# Patient Record
Sex: MALE | Race: WHITE | NOT HISPANIC OR LATINO | Employment: FULL TIME | URBAN - METROPOLITAN AREA
[De-identification: names, ages, dates, MRNs, and addresses within clinical notes are randomized per-mention and may not be internally consistent; named-entity substitution may affect disease eponyms.]

---

## 2017-01-19 ENCOUNTER — OFFICE VISIT (OUTPATIENT)
Dept: FAMILY MEDICINE | Facility: CLINIC | Age: 52
End: 2017-01-19
Payer: COMMERCIAL

## 2017-01-19 VITALS
WEIGHT: 191.2 LBS | HEART RATE: 73 BPM | BODY MASS INDEX: 28.22 KG/M2 | TEMPERATURE: 98.2 F | DIASTOLIC BLOOD PRESSURE: 80 MMHG | OXYGEN SATURATION: 96 % | SYSTOLIC BLOOD PRESSURE: 126 MMHG

## 2017-01-19 DIAGNOSIS — J32.9 OTHER SINUSITIS, UNSPECIFIED CHRONICITY: Primary | ICD-10-CM

## 2017-01-19 PROCEDURE — 99213 OFFICE O/P EST LOW 20 MIN: CPT | Mod: 24 | Performed by: PHYSICIAN ASSISTANT

## 2017-01-19 NOTE — MR AVS SNAPSHOT
"              After Visit Summary   2017    Jaguar Liu    MRN: [id number]           Patient Information     Date Of Birth          1965        Visit Information        Provider Department      2017 1:10 PM Otto Leung PA-C [facility]        Today's Diagnoses     Other sinusitis, unspecified chronicity    -  1        Follow-ups after your visit        Who to contact     If you have questions or need follow up information about today's clinic visit or your schedule please contact [facility] directly at [phone].  Normal or non-critical lab and imaging results will be communicated to you by [employer], letter or phone within 4 business days after the clinic has received the results. If you do not hear from us within 7 days, please contact the clinic through [employer] or phone. If you have a critical or abnormal lab result, we will notify you by phone as soon as possible.  Submit refill requests through [employer] or call your pharmacy and they will forward the refill request to us. Please allow 3 business days for your refill to be completed.          Additional Information About Your Visit        MyChart Information     [employer] lets you send messages to your doctor, view your test results, renew your prescriptions, schedule appointments and more. To sign up, go to [email or web address] . Click on \"Log in\" on the left side of the screen, which will take you to the Welcome page. Then click on \"Sign up Now\" on the right side of the page.     You will be asked to enter the access code listed below, as well as some personal information. Please follow the directions to create your username and password.     Your access code is: [email or web address]  Expires: 2017  1:21 PM     Your access code will  in 90 days. If you need help or a new code, please call your [facility] or [phone].        Care EveryWhere ID     This is your Care EveryWhere ID. This could be " used by other organizations to access your [facility] medical records  [id number]        Your Vitals Were     Pulse Temperature Pulse Oximetry             73 98.2  F (36.8  C) (Oral) 96%          Blood Pressure from Last 3 Encounters:   01/19/17 126/80   11/11/16 149/91   11/02/16 122/76    Weight from Last 3 Encounters:   01/19/17 191 lb 3.2 oz (86.728 kg)   11/15/16 194 lb (87.998 kg)   11/07/16 193 lb 9.4 oz (87.81 kg)              Today, you had the following     No orders found for display         Today's Medication Changes          These changes are accurate as of: 1/19/17  1:21 PM.  If you have any questions, ask your nurse or doctor.               Start taking these medicines.        Dose/Directions    amoxicillin-clavulanate 875-125 MG per tablet   Commonly known as:  AUGMENTIN   Used for:  Other sinusitis, unspecified chronicity   Started by:  Otto Leung PA-C        Dose:  1 tablet   Take 1 tablet by mouth 2 times daily   Quantity:  20 tablet   Refills:  0            Where to get your medicines      These medications were sent to [facility] [address] [place], [place] [address], [place] - [address]  [address], [place] [address]     Phone:  [phone]    - amoxicillin-clavulanate 875-125 MG per tablet             Primary Care Provider Office Phone # Fax #    Freddy Marroquin [phone]       [facility] [address] [facility] [address]        Thank you!     Thank you for choosing [facility]  for your care. Our goal is always to provide you with excellent care. Hearing back from our patients is one way we can continue to improve our services. Please take a few minutes to complete the written survey that you may receive in the mail after your visit with us. Thank you!             Your Updated Medication List - Protect others around you: Learn how to safely use, store and throw away your medicines at www.disposemymeds.org.          This list is  accurate as of: 1/19/17  1:21 PM.  Always use your most recent med list.                   Brand Name Dispense Instructions for use    amoxicillin-clavulanate 875-125 MG per tablet    AUGMENTIN    20 tablet    Take 1 tablet by mouth 2 times daily       cetirizine 10 MG tablet    zyrTEC     Take 10 mg by mouth 2 times daily       NEXIUM PO      Take by mouth daily

## 2017-01-19 NOTE — PROGRESS NOTES
SUBJECTIVE:                                                    Jaguar Liu is a 51 year old male who presents to clinic today for the following health issues:    RESPIRATORY SYMPTOMS      Duration: 1 month - was feeling better - 2 days sx's got worse    Description Cough, chest congestion, sore throat which has resolved, shortness of breath, nasal congestion.    Severity: moderate    Accompanying signs and symptoms: None    History (predisposing factors):  none    Precipitating or alleviating factors: None    Therapies tried and outcome:  Dayquil, nyquil - not much relief      Problem list and histories reviewed & adjusted, as indicated.  Additional history: as documented    Patient Active Problem List   Diagnosis     CARDIOVASCULAR SCREENING; LDL GOAL LESS THAN 160     FHx: prostate cancer     Tear of medial meniscus of left knee     Patellofemoral arthritis     S/P arthroscopy of left knee     Past Surgical History   Procedure Laterality Date     Rotator cuff repair rt/lt       Arthroscopy knee rt/lt       C oral surgery procedure       C eye service or procedure opnp       Echo stress test  2011     Hc knee scope,med/lat menisectomy Left 11/11/16     Arthroscopy knee Left 11/11/2016     Procedure: ARTHROSCOPY KNEE;  Surgeon: Freddy Kenney MD;  Location:  OR       Social History   Substance Use Topics     Smoking status: Never Smoker      Smokeless tobacco: Not on file     Alcohol Use: Yes      Comment: social drink     Family History   Problem Relation Age of Onset     CANCER Mother      lung cancer     CANCER Father      lung cancer     Prostate Cancer Brother      age 52         Current Outpatient Prescriptions   Medication Sig Dispense Refill     amoxicillin-clavulanate (AUGMENTIN) 875-125 MG per tablet Take 1 tablet by mouth 2 times daily 20 tablet 0     Esomeprazole Magnesium (NEXIUM PO) Take by mouth daily       cetirizine (ZYRTEC) 10 MG tablet Take 10 mg by mouth 2 times daily        No Known  Allergies  Problem list, Medication list, Allergies, and Medical/Social/Surgical histories reviewed in [facility] and updated as appropriate.    ROS:  Constitutional, HEENT, cardiovascular, pulmonary, gi and gu systems are negative, except as otherwise noted.    OBJECTIVE:                                                    /80 mmHg  Pulse 73  Temp(Src) 98.2  F (36.8  C) (Oral)  Wt 191 lb 3.2 oz (86.728 kg)  SpO2 96%  Body mass index is 28.22 kg/(m^2).  GENERAL: healthy, alert and no distress  EYES: Eyes grossly normal to inspection, PERRL and conjunctivae and sclerae normal  HENT: ear canals and TM's normal, nose and mouth without ulcers or lesions. Nasal congestion with posterior nasal drainage. no maxillary tenderness.   NECK: no adenopathy, no asymmetry, masses, or scars and thyroid normal to palpation  RESP: lungs clear to auscultation - no rales, rhonchi or wheezes  CV: regular rate and rhythm, normal S1 S2, no S3 or S4, no murmur, click or rub, no peripheral edema    Diagnostic Test Results:  none      ASSESSMENT/PLAN:                                                        ICD-10-CM    1. Other sinusitis, unspecified chronicity J32.9 amoxicillin-clavulanate (AUGMENTIN) 875-125 MG per tablet   Warning signs discussed.  side effects discussed  Symptomatic treatment: such as fluids,  OTC acetaminophen and /or non-steroidal anti-inflammatory medication.  Follow up  1-2 wks as needed     Otto Leung PA-C  [facility]

## 2017-04-10 ENCOUNTER — TELEPHONE (OUTPATIENT)
Dept: FAMILY MEDICINE | Facility: CLINIC | Age: 52
End: 2017-04-10

## 2017-04-10 NOTE — TELEPHONE ENCOUNTER
Panel Management Review      Patient has the following on his problem list: None      Composite cancer screening  Chart review shows that this patient is due/due soon for the following Colonoscopy  Summary:    Patient is due/failing the following:   COLONOSCOPY    Action needed:   Patient needs referral/order: colon    Type of outreach:    Sent letter.    Questions for provider review:    None                                                                                                                                    OUMAR Thompson   11:02 AM  4/10/2017         Chart routed to closed .

## 2017-04-10 NOTE — LETTER
[facility]  [address] [facility] [address]  Phone: [phone]    04/10/17    Jaguar Liu  [address] [place] [address]      To whom it may concern:     Our records indicate that you have not scheduled for a(n)colonoscopy which was recommended by your health care team. Monitoring and managing your preventative and chronic health conditions are very important to us.     If you have received your health care elsewhere, please provide us with that information so it can be documented in your chart.    Please call [phone] or message us through your [email or web address] account to schedule an appointment or provide information for your chart.     I look forward to seeing you and working with you on your health care needs.       *If you have already scheduled an appointment, please disregard this reminder    Sincerely,      Freddy Marroquin MD

## 2017-05-10 ENCOUNTER — OFFICE VISIT (OUTPATIENT)
Dept: FAMILY MEDICINE | Facility: CLINIC | Age: 52
End: 2017-05-10
Payer: COMMERCIAL

## 2017-05-10 VITALS
OXYGEN SATURATION: 97 % | BODY MASS INDEX: 28.06 KG/M2 | HEART RATE: 68 BPM | WEIGHT: 190 LBS | SYSTOLIC BLOOD PRESSURE: 130 MMHG | DIASTOLIC BLOOD PRESSURE: 75 MMHG | TEMPERATURE: 97 F

## 2017-05-10 DIAGNOSIS — R42 DIZZINESS: Primary | ICD-10-CM

## 2017-05-10 DIAGNOSIS — J06.9 VIRAL URI: ICD-10-CM

## 2017-05-10 PROCEDURE — 99214 OFFICE O/P EST MOD 30 MIN: CPT | Performed by: FAMILY MEDICINE

## 2017-05-10 NOTE — NURSING NOTE
"No chief complaint on file.      Initial /75  Pulse 68  Temp 97  F (36.1  C) (Oral)  Wt 190 lb (86.2 kg)  SpO2 97%  BMI 28.06 kg/m2 Estimated body mass index is 28.06 kg/(m^2) as calculated from the following:    Height as of 11/15/16: 5' 9\" (1.753 m).    Weight as of this encounter: 190 lb (86.2 kg).  BP completed using cuff size: kirsty LANDA CMA ([facility])  11:58 AM 5/10/2017      "

## 2017-05-10 NOTE — MR AVS SNAPSHOT
After Visit Summary   5/10/2017    Jaguar Liu    MRN: [id number]           Patient Information     Date Of Birth          1965        Visit Information        Provider Department      5/10/2017 12:00 PM Joselyn Rayo MD [facility]        Today's Diagnoses     Dizziness    -  1      Care Instructions      Dizziness (Vertigo) and Balance Problems: Ensuring Your Safety  Falls or accidents can lead to pain, broken bones, and fear of future falls. Protect yourself and others by preparing for episodes. Simple steps can help increase your safety at home and wherever you go.  Lighting    Keep all areas well lit. This helps your eyes send the right signals to the brain. It also makes you less likely to trip and fall. If bright lights make symptoms worse, dim the lights or lie in a dark room until the dizziness passes. Then turn the lights back to their normal level.  Tips:    Keep a flashlight by the bed.    Place nightlights in bathrooms and hallways.    Replace burned-out bulbs, or have someone replace them for you.  Fall prevention  To reduce your risk of falling:    Rise out of a bed or chair slowly.    Wear low-heeled shoes that fit properly and have slip-resistant soles.    Remove throw rugs. Clear clutter from walkways.    Use handrails on stairs. Have handrails installed or adjusted if needed.    Install grab bars in the bathroom. Don't use towel racks for balance.    Use a shower stool. Also, apply adhesive strips to the shower or tub floor.  Going out  With a little time and preparation, you can get around safely.  Tips:    Bring a cane or walking aid if needed.    Give yourself plenty of time in case a dizziness episode begins.    Be patient. If an activity like walking through a crowded shop causes you stress, you may not be ready for it yet.  Driving  If you become dizzy or disoriented while driving, you could hurt yourself and others. That's why it's best to avoid  driving until symptoms subside. In some cases, your license may be temporarily held until it's safe for you to drive again.  For safety:    Ask a friend to drive for you.    Take public transportation.    Walk to stores and other places when you can.  Asking for help  Don't be afraid to ask for help running errands, cooking meals, and doing exercise. Whether it's a friend, loved one, neighbor, or stranger on the street, a little help can make a world of difference.     7454-6517 [employer]. [address] [place] [address]. All rights reserved. This information is not intended as a substitute for professional medical care. Always follow your healthcare professional's instructions.        Dizziness (Uncertain Cause)  Dizziness is a common symptom. It may be described as lightheadedness, spinning, or feeling like you are going to faint. Dizziness can have many causes.  Be sure to tell the healthcare provider about:    All medicines you take, including prescription, over-the-counter, herbs, and supplements    Any other symptoms you have    Any health problems you are being treated for    Anything that causes the dizziness to get worse or better  Today's exam did not show an exact cause for your dizziness. Other tests may be needed. Follow up with your healthcare provider.  Home care    Dizziness that occurs with sudden standing may be a sign of mild dehydration. Drink extra fluids for the next few days.    If you recently started a new medicine, stopped a medicine, or had the dose of a current medicine changed, talk with the prescribing healthcare provider. Your medicine plan may need adjustment.    If dizziness lasts more than a few seconds, sit or lie down until it passes. This may help prevent injury in case you pass out.    Do not drive or use power tools or dangerous equipment until you have had no dizziness for at least 48 hours.  Follow-up care  Follow up with your healthcare provider for  "further evaluation within the next 7 days or as advised.  When to seek medical advice  Call your healthcare provider for any of the following:    Worsening of symptoms or new symptoms    Passing out or seizure    Repeated vomiting    Headache    Palpitations (the sense that your heart is fluttering or beating fast or hard)    Shortness of breath    Blood in vomit or stool (black or red color)    Weakness of an arm or leg or one side of the face    Vision or hearing changes    Trouble walking or speaking    Chest, arm, neck, back, or jaw pain       4806-2208 [employer]. [address] [place] [address]. All rights reserved. This information is not intended as a substitute for professional medical care. Always follow your healthcare professional's instructions.              Follow-ups after your visit        Who to contact     If you have questions or need follow up information about today's clinic visit or your schedule please contact [facility] directly at [phone].  Normal or non-critical lab and imaging results will be communicated to you by MyChart, letter or phone within 4 business days after the clinic has received the results. If you do not hear from us within 7 days, please contact the clinic through [employer] or phone. If you have a critical or abnormal lab result, we will notify you by phone as soon as possible.  Submit refill requests through [employer] or call your pharmacy and they will forward the refill request to us. Please allow 3 business days for your refill to be completed.          Additional Information About Your Visit        [employer] Information     [employer] lets you send messages to your doctor, view your test results, renew your prescriptions, schedule appointments and more. To sign up, go to [email or web address] . Click on \"Log in\" on the left side of the screen, which will take you to the Welcome page. Then click on \"Sign up Now\" on the right side of " the page.     You will be asked to enter the access code listed below, as well as some personal information. Please follow the directions to create your username and password.     Your access code is: [email or web address]  Expires: 2017 12:34 PM     Your access code will  in 90 days. If you need help or a new code, please call your [place] clinic or [phone].        Care EveryWhere ID     This is your Care EveryWhere ID. This could be used by other organizations to access your [place] medical records  [id number]        Your Vitals Were     Pulse Temperature Pulse Oximetry BMI (Body Mass Index)          68 97  F (36.1  C) (Oral) 97% 28.06 kg/m2         Blood Pressure from Last 3 Encounters:   05/10/17 130/75   17 126/80   16 (!) 149/91    Weight from Last 3 Encounters:   05/10/17 190 lb (86.2 kg)   17 191 lb 3.2 oz (86.7 kg)   11/15/16 194 lb (88 kg)              Today, you had the following     No orders found for display       Primary Care Provider Office Phone # Fax #    Freddy Marroquin [phone]       [facility] [address] [facility] [address]        Thank you!     Thank you for choosing [facility]  for your care. Our goal is always to provide you with excellent care. Hearing back from our patients is one way we can continue to improve our services. Please take a few minutes to complete the written survey that you may receive in the mail after your visit with us. Thank you!             Your Updated Medication List - Protect others around you: Learn how to safely use, store and throw away your medicines at www.disposemymeds.org.          This list is accurate as of: 5/10/17 12:34 PM.  Always use your most recent med list.                   Brand Name Dispense Instructions for use    amoxicillin-clavulanate 875-125 MG per tablet    AUGMENTIN    20 tablet    Take 1 tablet by mouth 2 times daily       cetirizine 10 MG tablet     zyrTEC     Take 10 mg by mouth 2 times daily       NEXIUM PO      Take by mouth daily

## 2017-05-10 NOTE — PROGRESS NOTES
SUBJECTIVE:                                                    Jaguar Liu is a 51 year old male who presents to clinic today for the following health issues:      Dizziness- concerned it maybe a sinus issue     Onset: today    Description:   Do you feel faint:  no   Does it feel like the surroundings (bed, room) are moving: no   Unsteady/off balance: no   Have you passed out or fallen: no     Intensity: mild    Progression of Symptoms:  same    Accompanying Signs & Symptoms:  Heart palpitations: no   Nausea, vomiting: no   Weakness in arms or legs: no   Fatigue: no   Vision or speech changes: no   Ringing in ears (Tinnitus): no   Hearing Loss: no    History:   Head trauma/concussion hx: no   Previous similar symptoms: no   Recent bleeding history: no     Precipitating factors:   Worse with activity or head movement: no   Any new medications (BP?): no   Alcohol/drug abuse/withdrawal: no     Alleviating factors:   Does staying in a fixed position give relief:  no        Therapies Tried and outcome: none    Description: patient was at work standing up in a meeting. Was standing up for about 10 minutes and patient took a deep breath and got slightly lightheaded. Just for a couple of seconds and just leaned up a gainst the wall and got better.  Patient then just did a sudden movement and then that wave of dizziness came to him again which he describes as not feeling stable lasted for a few seconds then lingered and so patient went straight to the nurse's office.   Nurse's office patient told his symptoms his BP was slightly higher than normal.   Patient then went home and is now feeling slightly better.   Patient hadn't eaten much prior just had a banana and 2 cups of coffee  No blurring of vision no syncope or presyncope    Aggravating factors: not sure  Relieving factors: rest   Chest pain or palpitation: No  Syncope or presyncope: No  Motor,sensory weakness, or slurring of speech: No  Headache: No  Blurring of  vision : No  Nausea: NO  Vomiting: No  Abdominal pain: No  Recent trauma: No     Patient thought his right ear felt funny the past couple of days.   Also the past 2 days has had some nasal congestion.    Tried supportive treatment  At home no relief  Additional Information: above    Problem list and histories reviewed & adjusted, as indicated.  Additional history: as documented    Problem list, Medication list, Allergies, and Medical/Social/Surgical histories reviewed in EPIC and updated as appropriate.    ROS:  Constitutional, HEENT, cardiovascular, pulmonary, gi and gu systems are negative, except as otherwise noted.    OBJECTIVE:                                                    /75  Pulse 68  Temp 97  F (36.1  C) (Oral)  Wt 190 lb (86.2 kg)  SpO2 97%  BMI 28.06 kg/m2  Body mass index is 28.06 kg/(m^2).  GENERAL: healthy, alert and no distress  EYES: Eyes grossly normal to inspection, PERRL and conjunctivae and sclerae normal  HENT: ear canals and TM's normal, nose and mouth without ulcers or lesions. Positive nasal congestion.   NECK: no adenopathy, no asymmetry, masses, or scars and thyroid normal to palpation  RESP: lungs clear to auscultation - no rales, rhonchi or wheezes  CV: regular rate and rhythm, normal S1 S2, no S3 or S4, no murmur, click or rub, no peripheral edema and peripheral pulses strong  ABDOMEN: soft, nontender, no hepatosplenomegaly, no masses and bowel sounds normal  MS: no gross musculoskeletal defects noted, no edema  SKIN: no suspicious lesions or rashes  NEURO: Normal strength and tone, mentation intact and speech normal  PSYCH: mentation appears normal, affect normal/bright. No thoughts of harming self or others     Diagnostic Test Results:  none   Patient declined ekg. Denies any cardiac signs or symptoms no exertional chest pain or shortness of breath. Patient states he runs 4-5 miles a day without problem     ASSESSMENT/PLAN:                                                         ICD-10-CM    1. Dizziness R42    2. Viral URI J06.9     B97.89        Dizziness is likely peripheral. Possible mild vertigo, positional  Brief and already resolving.  Discussed utility of labs, since already improving, patient ok to hold off on additional labs but if symptoms persist or worsen come in asap  Patient also having viral uri signs or symptoms. Supportive treatment  Discussed. Follow up if persist  See patient instructions.  Signs and symptoms of worsening dizziness discussed. Alarm symptoms of possible CVA or cardiac events discussed. If with any of these advised to go to ER.    No driving and avoid being on any unprotected heights until dizziness is resolved.    Recommend follow up with primary care provider if no relief , sooner if worse  Adverse reactions of medications discussed.  Over the counter medications discussed.   Aware to come back in if with worsening symptoms or if no relief despite treatment plan  Patient voiced understanding and had no further questions.     >13 minutes of the 25 minute visit was spent counseling the patient on his diagnosis and treatment plan     MD Joselyn Crews MD  [facility]

## 2017-05-10 NOTE — PATIENT INSTRUCTIONS
Dizziness (Vertigo) and Balance Problems: Ensuring Your Safety  Falls or accidents can lead to pain, broken bones, and fear of future falls. Protect yourself and others by preparing for episodes. Simple steps can help increase your safety at home and wherever you go.  Lighting    Keep all areas well lit. This helps your eyes send the right signals to the brain. It also makes you less likely to trip and fall. If bright lights make symptoms worse, dim the lights or lie in a dark room until the dizziness passes. Then turn the lights back to their normal level.  Tips:    Keep a flashlight by the bed.    Place nightlights in bathrooms and hallways.    Replace burned-out bulbs, or have someone replace them for you.  Fall prevention  To reduce your risk of falling:    Rise out of a bed or chair slowly.    Wear low-heeled shoes that fit properly and have slip-resistant soles.    Remove throw rugs. Clear clutter from walkways.    Use handrails on stairs. Have handrails installed or adjusted if needed.    Install grab bars in the bathroom. Don't use towel racks for balance.    Use a shower stool. Also, apply adhesive strips to the shower or tub floor.  Going out  With a little time and preparation, you can get around safely.  Tips:    Bring a cane or walking aid if needed.    Give yourself plenty of time in case a dizziness episode begins.    Be patient. If an activity like walking through a crowded shop causes you stress, you may not be ready for it yet.  Driving  If you become dizzy or disoriented while driving, you could hurt yourself and others. That's why it's best to avoid driving until symptoms subside. In some cases, your license may be temporarily held until it's safe for you to drive again.  For safety:    Ask a friend to drive for you.    Take public transportation.    Walk to stores and other places when you can.  Asking for help  Don't be afraid to ask for help running errands, cooking meals, and doing  exercise. Whether it's a friend, loved one, neighbor, or stranger on the street, a little help can make a world of difference.     6630-7471 [employer]. [address] [place] [address], [place] [address]. All rights reserved. This information is not intended as a substitute for professional medical care. Always follow your healthcare professional's instructions.        Dizziness (Uncertain Cause)  Dizziness is a common symptom. It may be described as lightheadedness, spinning, or feeling like you are going to faint. Dizziness can have many causes.  Be sure to tell the healthcare provider about:    All medicines you take, including prescription, over-the-counter, herbs, and supplements    Any other symptoms you have    Any health problems you are being treated for    Anything that causes the dizziness to get worse or better  Today's exam did not show an exact cause for your dizziness. Other tests may be needed. Follow up with your healthcare provider.  Home care    Dizziness that occurs with sudden standing may be a sign of mild dehydration. Drink extra fluids for the next few days.    If you recently started a new medicine, stopped a medicine, or had the dose of a current medicine changed, talk with the prescribing healthcare provider. Your medicine plan may need adjustment.    If dizziness lasts more than a few seconds, sit or lie down until it passes. This may help prevent injury in case you pass out.    Do not drive or use power tools or dangerous equipment until you have had no dizziness for at least 48 hours.  Follow-up care  Follow up with your healthcare provider for further evaluation within the next 7 days or as advised.  When to seek medical advice  Call your healthcare provider for any of the following:    Worsening of symptoms or new symptoms    Passing out or seizure    Repeated vomiting    Headache    Palpitations (the sense that your heart is fluttering or beating fast or hard)    Shortness of  breath    Blood in vomit or stool (black or red color)    Weakness of an arm or leg or one side of the face    Vision or hearing changes    Trouble walking or speaking    Chest, arm, neck, back, or jaw pain       5333-0952 [employer]. [address] [place] [address]. All rights reserved. This information is not intended as a substitute for professional medical care. Always follow your healthcare professional's instructions.

## 2017-10-15 NOTE — NURSING NOTE
"Chief Complaint   Patient presents with     Cough       Initial /80 mmHg  Pulse 73  Temp(Src) 98.2  F (36.8  C) (Oral)  Wt 191 lb 3.2 oz (86.728 kg)  SpO2 96% Estimated body mass index is 28.22 kg/(m^2) as calculated from the following:    Height as of 11/15/16: 5' 9\" (1.753 m).    Weight as of this encounter: 191 lb 3.2 oz (86.728 kg).  BP completed using cuff size: nini Ching CMA      " 1) Please continue Topamax 50mg PO BID for now with plans to increase to 100mg PO BID in 3 days   2) Continue Effexor XR to 150mg PO daily and Gabapentin 300mg PO TID.  3) Continue Trazodone to 100mg PO QHS  4) Pt is agreeable to enter in-pt rehab and social work to coordinate this effort  5) Patient does not require psychiatric 1:1

## 2021-04-20 ENCOUNTER — IMMUNIZATION (OUTPATIENT)
Dept: PEDIATRICS | Facility: CLINIC | Age: 56
End: 2021-04-20
Payer: COMMERCIAL

## 2021-04-20 PROCEDURE — 91300 PR COVID VAC PFIZER DIL RECON 30 MCG/0.3 ML IM: CPT

## 2021-04-20 PROCEDURE — 0001A PR COVID VAC PFIZER DIL RECON 30 MCG/0.3 ML IM: CPT

## 2021-05-11 ENCOUNTER — IMMUNIZATION (OUTPATIENT)
Dept: PEDIATRICS | Facility: CLINIC | Age: 56
End: 2021-05-11
Attending: INTERNAL MEDICINE
Payer: COMMERCIAL

## 2021-05-11 PROCEDURE — 91300 PR COVID VAC PFIZER DIL RECON 30 MCG/0.3 ML IM: CPT

## 2021-05-11 PROCEDURE — 0002A PR COVID VAC PFIZER DIL RECON 30 MCG/0.3 ML IM: CPT

## 2021-05-19 NOTE — PROGRESS NOTES
SUBJECTIVE:                                                    Jaguar Liu is a 55 year old male who presents to clinic today for the following health issues:    Back Pain      Duration: started in the winter time, getting worse        Specific cause: shoveling the driveway    Description:   Location of pain: low back left  Character of pain: shooting pain down buttock   Pain radiation:radiates into the left leg  New numbness or weakness in legs, not attributed to pain:  no    Intensity: Currently 3/10    History:   Pain interferes with job: YES,   History of back problems: no prior back problems  Any previous MRI or X-rays: None, years ago  Sees a specialist for back pain:  No  Therapies tried without relief: chiropractor    Alleviating factors:   Improved by: chiropractor, cold and stretch, rest.      Precipitating factors:  Worsened by: Lifting      Like to golf and pain with that.   Off and on back tightness since dec. Pain down leg after lifting a plant pot 2 wks ago.     Problem list and histories reviewed & adjusted, as indicated.  Additional history: as documented    Patient Active Problem List   Diagnosis     CARDIOVASCULAR SCREENING; LDL GOAL LESS THAN 160     FHx: prostate cancer     Tear of medial meniscus of left knee     Patellofemoral arthritis     S/P arthroscopy of left knee     Past Surgical History:   Procedure Laterality Date     ARTHROSCOPY KNEE Left 11/11/2016    Procedure: ARTHROSCOPY KNEE;  Surgeon: Freddy Kenney MD;  Location: [facility] OR     ARTHROSCOPY KNEE RT/LT       C EYE SERVICE OR PROCEDURE OPNP       C ORAL SURGERY PROCEDURE       ECHO STRESS TEST  2011     HC KNEE SCOPE,MED/LAT MENISECTOMY Left 11/11/16     ROTATOR CUFF REPAIR RT/LT         Social History     Tobacco Use     Smoking status: Never Smoker     Smokeless tobacco: Never Used   Substance Use Topics     Alcohol use: Yes     Comment: social drink     Family History   Problem Relation Age of Onset     Cancer Mother        "  lung cancer     Cancer Father         lung cancer     Prostate Cancer Brother         age 52         Current Outpatient Medications   Medication Sig Dispense Refill     cetirizine (ZYRTEC) 10 MG tablet Take 10 mg by mouth 2 times daily        predniSONE (DELTASONE) 20 MG tablet Take 2 tablets (40 mg) by mouth daily for 5 days 10 tablet 0     Esomeprazole Magnesium (NEXIUM PO) Take by mouth daily       No Known Allergies  Problem list, Medication list, Allergies, and Medical/Social/Surgical histories reviewed in [facility] and updated as appropriate.    ROS:  CV: NEGATIVE for chest pain, palpitations or peripheral edema  C: NEGATIVE for fever, chills, change in weight  E/M: NEGATIVE for ear, mouth and throat problems  R: NEGATIVE for significant cough or SOB    OBJECTIVE:                                                    /86   Pulse 60   Temp 97.1  F (36.2  C) (Tympanic)   Ht 1.753 m (5' 9\")   Wt 87.5 kg (193 lb)   SpO2 98%   BMI 28.50 kg/m    Body mass index is 28.5 kg/m .   GENERAL: healthy, alert, well nourished, well hydrated, no distress  RESP: lungs clear to auscultation - no rales, no rhonchi, no wheezes  CV: regular rates and rhythm, normal S1 S2, no S3 or S4 and no murmur, no click or rub -  ABDOMEN: soft, no tenderness, no  hepatosplenomegaly, no masses, normal bowel sounds  Lumber/Thoracic Spine Exam: Tender:  none  Range of Motion:  full range of motion gary lower extremities   Strength:  5/5 gary lower extremities   Special tests:  negative straight leg raises  Hip Exam: Hip ROM full         ASSESSMENT/PLAN:                                                        ICD-10-CM    1. Lumbar radiculopathy  M54.16 predniSONE (DELTASONE) 20 MG tablet     [facility] PT [facility] HAND REFERRAL   ice or cold packs 20 minutes every 2-3 hrs as needed to relieve pain and swelling, for the first 2 days. Then can apply heat 20 minutes every 2-3 hrs (avoid sleeping on heating pad) there after as needed.   If you can tolerate, " start non-steroidal anti-inflammatory medication like: Ibuprofen 600-800 mg three times daily or Aleve 2 tablets of over the counter strength twice a day as needed.   Tylenol can help with pain also.    Active range of motion exercises encouraged  Activity modification trying to avoid activities that cause you pain.   Start prednisone. warning signs discussed. side effects discussed   Start PHYSICAL THERAPY.   Recheck 4 wk.       Otto Leung PA-C  [facility]

## 2021-05-20 ENCOUNTER — OFFICE VISIT (OUTPATIENT)
Dept: FAMILY MEDICINE | Facility: CLINIC | Age: 56
End: 2021-05-20
Payer: COMMERCIAL

## 2021-05-20 VITALS
WEIGHT: 193 LBS | BODY MASS INDEX: 28.58 KG/M2 | SYSTOLIC BLOOD PRESSURE: 136 MMHG | TEMPERATURE: 97.1 F | HEART RATE: 60 BPM | DIASTOLIC BLOOD PRESSURE: 86 MMHG | HEIGHT: 69 IN | OXYGEN SATURATION: 98 %

## 2021-05-20 DIAGNOSIS — M54.16 LUMBAR RADICULOPATHY: Primary | ICD-10-CM

## 2021-05-20 PROCEDURE — 99204 OFFICE O/P NEW MOD 45 MIN: CPT | Performed by: PHYSICIAN ASSISTANT

## 2021-05-20 RX ORDER — PREDNISONE 20 MG/1
40 TABLET ORAL DAILY
Qty: 10 TABLET | Refills: 0 | Status: SHIPPED | OUTPATIENT
Start: 2021-05-20 | End: 2021-05-25

## 2021-05-20 ASSESSMENT — MIFFLIN-ST. JEOR: SCORE: 1690.82

## 2021-05-20 ASSESSMENT — PAIN SCALES - GENERAL: PAINLEVEL: MILD PAIN (3)

## 2021-05-20 NOTE — NURSING NOTE
"Chief Complaint   Patient presents with     Back Pain       Initial /86   Pulse 60   Temp 97.1  F (36.2  C) (Tympanic)   Ht 1.753 m (5' 9\")   Wt 87.5 kg (193 lb)   SpO2 98%   BMI 28.50 kg/m   Estimated body mass index is 28.5 kg/m  as calculated from the following:    Height as of this encounter: 1.753 m (5' 9\").    Weight as of this encounter: 87.5 kg (193 lb).  Medication Reconciliation: complete  Marcella Wetzel CMA  "

## 2021-05-24 ENCOUNTER — TELEPHONE (OUTPATIENT)
Dept: FAMILY MEDICINE | Facility: CLINIC | Age: 56
End: 2021-05-24

## 2021-05-24 DIAGNOSIS — M54.16 LUMBAR RADICULOPATHY: ICD-10-CM

## 2021-05-24 RX ORDER — PREDNISONE 20 MG/1
40 TABLET ORAL DAILY
Qty: 10 TABLET | Refills: 0 | Status: CANCELLED | OUTPATIENT
Start: 2021-05-24

## 2021-05-24 NOTE — TELEPHONE ENCOUNTER
Patient states took last dose of prednisone today, has helped with the back pain but now c/o sciatic pain  Has been taking over the counter Ibuprofen and applying heat which has also helped    To provider to advise    Sahra NAIDUN, RN

## 2021-05-24 NOTE — TELEPHONE ENCOUNTER
Patient notified of provider's message as written below. Patient verbalized good understanding, had no further questions and needed no further support.Shana Buckner R.N.

## 2021-05-24 NOTE — TELEPHONE ENCOUNTER
Recommend he continue with ice and ibu.   Prednisone is only used for short time as I prescribed.   Try to see how the week  Goes as this may continue to get better day by day.    Otto Leung PA-C

## 2021-05-27 NOTE — PROGRESS NOTES
"SUBJECTIVE:                                                    Jaguar Liu is a 55 year old male who presents to clinic today for the following health issues:    Back Pain      Duration: ***        Specific cause: {.:277877::\"none\"}    Description:   Location of pain: {.:106968}  Character of pain: {.:384159}  Pain radiation:{.:961388::\"none\"}  New numbness or weakness in legs, not attributed to pain:  { :702352::\"no\"}    Intensity: {.:249568::\"Currently ***/10\"}    History:   Pain interferes with job: {.:113268::\"***\"}  History of back problems: {.:325549::\"no prior back problems\"}  Any previous MRI or X-rays: {.:199437::\"None\"}  Sees a specialist for back pain:  { :203909::\"No\"}  Therapies tried without relief: {.:598992}    Alleviating factors:   Improved by: {.:552196}      Precipitating factors:  Worsened by: {.:266266::\"Nothing\"}          Problem list and histories reviewed & adjusted, as indicated.  Additional history: as documented    Patient Active Problem List   Diagnosis     CARDIOVASCULAR SCREENING; LDL GOAL LESS THAN 160     FHx: prostate cancer     Tear of medial meniscus of left knee     Patellofemoral arthritis     S/P arthroscopy of left knee     Past Surgical History:   Procedure Laterality Date     ARTHROSCOPY KNEE Left 11/11/2016    Procedure: ARTHROSCOPY KNEE;  Surgeon: Freddy Kenney MD;  Location: [facility] OR     ARTHROSCOPY KNEE RT/LT       C EYE SERVICE OR PROCEDURE OPNP       C ORAL SURGERY PROCEDURE       ECHO STRESS TEST  2011     HC KNEE SCOPE,MED/LAT MENISECTOMY Left 11/11/16     ROTATOR CUFF REPAIR RT/LT         Social History     Tobacco Use     Smoking status: Never Smoker     Smokeless tobacco: Never Used   Substance Use Topics     Alcohol use: Yes     Comment: social drink     Family History   Problem Relation Age of Onset     Cancer Mother         lung cancer     Cancer Father         lung cancer     Prostate Cancer Brother         age 52         Current Outpatient Medications " "  Medication Sig Dispense Refill     cetirizine (ZYRTEC) 10 MG tablet Take 10 mg by mouth 2 times daily        Esomeprazole Magnesium (NEXIUM PO) Take by mouth daily       No Known Allergies  Problem list, Medication list, Allergies, and Medical/Social/Surgical histories reviewed in [facility] and updated as appropriate.    ROS:  {ROS - brief:393608::\"C: NEGATIVE for fever, chills, change in weight\",\"E/M: NEGATIVE for ear, mouth and throat problems\",\"R: NEGATIVE for significant cough or SOB\",\"CV: NEGATIVE for chest pain, palpitations or peripheral edema\"}    OBJECTIVE:                                                    There were no vitals taken for this visit.  There is no height or weight on file to calculate BMI.   GENERAL: healthy, alert, well nourished, well hydrated, no distress  RESP: lungs clear to auscultation - no rales, no rhonchi, no wheezes  CV: regular rates and rhythm, normal S1 S2, no S3 or S4 and no murmur, no click or rub -  ABDOMEN: soft, no tenderness, no  hepatosplenomegaly, no masses, normal bowel sounds  Lumber/Thoracic Spine Exam: Tender:  {THORACIC/LUMBAR SPINE TENDERNESS:722571}  Non-tender:  {THORACIC/LUMBAR SPINE TENDERNESS:925893}  Range of Motion:  full range of motion gary lower extremities   Strength:  5/5 gary lower extremities   Special tests:  {FSOC THORACIC/LUMBAR SPINE SPECIAL TESTS:470354}  Hip Exam: {FSOC SHORT HIP:973876}    {Diagnostic Test Results:146972}     ASSESSMENT/PLAN:                                                    No diagnosis found.    {FOLLOW UP CLINIC OPTIONS:413942}    Otto Leung PA-C  [facility]  "

## 2021-05-28 ENCOUNTER — ANCILLARY PROCEDURE (OUTPATIENT)
Dept: GENERAL RADIOLOGY | Facility: CLINIC | Age: 56
End: 2021-05-28
Attending: PHYSICIAN ASSISTANT
Payer: COMMERCIAL

## 2021-05-28 ENCOUNTER — OFFICE VISIT (OUTPATIENT)
Dept: FAMILY MEDICINE | Facility: CLINIC | Age: 56
End: 2021-05-28
Payer: COMMERCIAL

## 2021-05-28 VITALS
WEIGHT: 196 LBS | HEART RATE: 63 BPM | RESPIRATION RATE: 18 BRPM | DIASTOLIC BLOOD PRESSURE: 82 MMHG | TEMPERATURE: 97.1 F | BODY MASS INDEX: 29.03 KG/M2 | SYSTOLIC BLOOD PRESSURE: 137 MMHG | HEIGHT: 69 IN | OXYGEN SATURATION: 98 %

## 2021-05-28 DIAGNOSIS — M54.16 LUMBAR RADICULOPATHY: Primary | ICD-10-CM

## 2021-05-28 PROCEDURE — 72100 X-RAY EXAM L-S SPINE 2/3 VWS: CPT | Performed by: RADIOLOGY

## 2021-05-28 PROCEDURE — 99213 OFFICE O/P EST LOW 20 MIN: CPT | Performed by: PHYSICIAN ASSISTANT

## 2021-05-28 RX ORDER — DICLOFENAC POTASSIUM 50 MG/1
50 TABLET, FILM COATED ORAL 3 TIMES DAILY
Qty: 90 TABLET | Refills: 0 | Status: SHIPPED | OUTPATIENT
Start: 2021-05-28 | End: 2023-06-02

## 2021-05-28 RX ORDER — METHOCARBAMOL 750 MG/1
750 TABLET, FILM COATED ORAL 3 TIMES DAILY
Qty: 90 TABLET | Refills: 0 | Status: SHIPPED | OUTPATIENT
Start: 2021-05-28 | End: 2021-06-27

## 2021-05-28 ASSESSMENT — MIFFLIN-ST. JEOR: SCORE: 1704.43

## 2021-05-28 NOTE — RESULT ENCOUNTER NOTE
Mr. Liu,    Your x-ray was read as normal by the radiologist.     Please contact the clinic if you have additional questions.  Thank you.    Sincerely,    Otto Leung PA-C

## 2021-05-28 NOTE — LETTER
May 28, 2021      Jaguar Liu  [address] [facility] [id number]        Mr. Liu,     Your x-ray was read as normal by the radiologist.     Please contact the clinic if you have additional questions.  Thank you.     Sincerely,     Otto Leung PA-C     Resulted Orders   XR Lumbar Spine 2/3 Views    Narrative    LUMBAR SPINE TWO TO THREE VIEWS   5/28/2021 10:20 AM     HISTORY: Lumbar radiculopathy.    COMPARISON: None.      Impression    IMPRESSION: Vertebral body heights are maintained. Posterior alignment  is normal. Disc spaces are relatively preserved. Soft tissues  unremarkable.    MARGOTH ANTONIO MD       If you have any questions or concerns, please call the clinic at the number listed above.       Sincerely,      Otto Leung PA-C/sp

## 2021-05-28 NOTE — PROGRESS NOTES
"  Assessment & Plan       ICD-10-CM    1. Lumbar radiculopathy  M54.16 XR Lumbar Spine 2/3 Views     methocarbamol (ROBAXIN) 750 MG tablet     diclofenac (CATAFLAM) 50 MG tablet     Talk to patient about his concerns.  We will get him started on Robaxin and diclofenac.  Warning signs side effects were discussed.  We will have him keep his appointment with physical therapy.  If things progressively worsen we may consider doing an MRI sooner than later.    Return in about 4 weeks (around 6/25/2021) for recheck.    RAOUL Gordillo [facility]    Heidi Montesinos is a 55 year old who presents for the following health issues     HPI     Pt here following up on low back pain that radiates into the knee, was last seen on 5/21/21and given prednisone which helped some but back is still bothering him. Is scheduled for therapy but unable to get in until 6/10/21  Pt saw chiropractor and was told his pelvis may be out of alignment   Feels ok with sitting and increase with standing and walking.   4/ 10 today. Feeling better today  Yesterday was 8/10.     History of knee surgery: Pt found a lump behind is right knee - wondering if this could be part of the reason for the back pain         Review of Systems   Constitutional, HEENT, cardiovascular, pulmonary, gi and gu systems are negative, except as otherwise noted.      Objective    /82   Pulse 63   Temp 97.1  F (36.2  C) (Tympanic)   Resp 18   Ht 1.753 m (5' 9\")   Wt 88.9 kg (196 lb)   SpO2 98%   BMI 28.94 kg/m    Body mass index is 28.94 kg/m .  Physical Exam   GENERAL: healthy, alert and no distress  No tenderness today of his lower lumbar back region.  He describes the pain more in the left buttock and down his left leg.  He has 5-5 bilateral lower extremity strength.  He has a negative straight leg raise but has sensation of tightness in his hamstring region.  Calves soft and tender neuro vas intact distally.  He does have a " Baker's cyst behind his left knee this nontender.    Results for orders placed or performed in visit on 05/28/21   XR Lumbar Spine 2/3 Views     Status: None    Narrative    LUMBAR SPINE TWO TO THREE VIEWS   5/28/2021 10:20 AM     HISTORY: Lumbar radiculopathy.    COMPARISON: None.      Impression    IMPRESSION: Vertebral body heights are maintained. Posterior alignment  is normal. Disc spaces are relatively preserved. Soft tissues  unremarkable.    MARGOTH ANTONIO MD

## 2021-06-10 ENCOUNTER — THERAPY VISIT (OUTPATIENT)
Dept: PHYSICAL THERAPY | Facility: CLINIC | Age: 56
End: 2021-06-10
Attending: PHYSICIAN ASSISTANT
Payer: COMMERCIAL

## 2021-06-10 DIAGNOSIS — G89.29 CHRONIC LEFT-SIDED LOW BACK PAIN WITHOUT SCIATICA: Primary | ICD-10-CM

## 2021-06-10 DIAGNOSIS — M54.50 CHRONIC LEFT-SIDED LOW BACK PAIN WITHOUT SCIATICA: Primary | ICD-10-CM

## 2021-06-10 DIAGNOSIS — M54.16 LUMBAR RADICULOPATHY: ICD-10-CM

## 2021-06-10 PROCEDURE — 97110 THERAPEUTIC EXERCISES: CPT | Mod: GP | Performed by: PHYSICAL THERAPIST

## 2021-06-10 PROCEDURE — 97161 PT EVAL LOW COMPLEX 20 MIN: CPT | Mod: GP | Performed by: PHYSICAL THERAPIST

## 2021-06-10 NOTE — PROGRESS NOTES
[facility] Physical Therapy Initial Evaluation  6/10/2021     Precautions/Restrictions/MD instructions: Evaluate and Treat for LBP w/ radiating pain into L leg    Therapist Assessment: Jaguar Liu is a 55 year old male patient presenting to Physical Therapy with LBP radiating into L leg. Patient demonstrates mildly decreased lumbar extension mobility, glute weakness, and tight calf/hamstring musculature B. Signs and symptoms are consistent with possible L-sided lumbar radiculopathy or posterior disc derangement. These impairments limit their ability to stand for longer periods of time and lift heavier items at work. Skilled PT services are necessary in order to reduce impairments and improve independent function.    SUBJECTIVE    Injury/Condition Details:  Presenting Complaint LBP radiating into L leg   Onset Timing/Date February 2021 with flare-up in May 2021; MD referral from 5/20/21   Mechanism His back started to bother him in February 2021 while shoveling snow. At this time the pain was just in his back, not into his legs. In May 2021, he was removing tree roots and noticed his back began to hurt even more, this time extending into his L leg.       Symptom Behavior Details    Primary Symptoms Sporadic symptoms; Activity/position dependent, pain (Location: top of L buttock radiating to back a knee, Quality: Sharp, ache)   Worst Pain 3/10   Symptom Provocators Standing for longer periods of time, increased amount of time walking, lifting heavier items   Best Pain 1/10    Symptom Relievers Ice, stretching, anti-inflammatory   Time of day dependent? No   Recent symptom change? symptoms improving     Prior Testing/Intervention for current condition:  Prior Tests  x-ray on 5/28/2021:  IMPRESSION: Vertebral body heights are maintained. Posterior alignment is normal. Disc spaces are relatively preserved. Soft tissues unremarkable.     MARGOTH NATONIO MD   Prior Treatment Medication and chiropractic     Lifestyle  & General Medical History:  Employment    Usual physical activities  (within past year) Computer work, prolonged sitting, prolonged standing   Orthopaedic History  Three knee scopes, 2 previous shoulder surgeries   Medication  Anti-inflammatory   Notable medical history See Epic Chart   Patient goals Golf and running; get back to active lifestyle in general   Patient Reported Health good     Red Flags: (Bold when present) - reviewed the following and denies  Cauda equina: progressive motor or sensory loss, urinary retention or overflow incontinence, new fecal incontinence, saddle anesthesia, significant motor deficits encompassing multiple nerve roots  Fracture: Significant trauma, prolonged corticosteroid use, osteoporosis, age >70  Infection: spinal procedure in the last 12 months, IV drug use, immunosuppression, fever, wound in spinal region, generally unwell  Malignancy: history of metastatic cancer, unexplained weight los      OBJECTIVE    Posture: no notable deficits    Dermatome Testing:  No deficits to light touch for L2-S1    Myotome Testing:   No deficits for L2-S1    Movement Loss:   Leon Mod Min Nil Pain   Flexion    x Tightness in back of legs   Extension   x     Side Bend R    x    Side Bend L    x      Repeated Movements:  Flexion in standing: no change in symptoms  Extension in standing: no change in symptoms    Neural Tension:  Slump: negative B for adverse neural tension  SLR: posterior knee musculare tightness noted B    HIP: (* indicates patient's primary complaint)    Hip PROM: WNL B     MMT R MMT L   FLX 5 5   ABD 4 3+   EXT 4+ 4+       ASSESSMENT/PLAN  Patient is a 55 year old male with lumbar complaints.    Patient has the following significant findings with corresponding treatment plan.                Diagnosis 1:  LBP; signs and symptoms consistent with potential posterior disc derangement or lumbar radiculopathy    Pain -  hot/cold therapy, US, electric stimulation,  mechanical traction, manual therapy, splint/taping/bracing/orthotics, self management, education, directional preference exercise and home program  Decreased ROM/flexibility - manual therapy, therapeutic exercise and home program  Decreased joint mobility - manual therapy, therapeutic exercise and home program  Decreased strength - therapeutic exercise, therapeutic activities and home program  Decreased function - therapeutic activities and home program    Therapy Evaluation Codes:   1) History comprised of:   Personal factors that impact the plan of care:      None.    Comorbidity factors that impact the plan of care are:      None.     Medications impacting care: Anti-inflammatory.  2) Examination of Body Systems comprised of:   Body structures and functions that impact the plan of care:      Lumbar spine.   Activity limitations that impact the plan of care are:      Lifting, Sitting, Sports, Standing, Walking and Working.  3) Clinical presentation characteristics are:   Stable/Uncomplicated.  4) Decision-Making    Low complexity using standardized patient assessment instrument and/or measureable assessment of functional outcome.  Cumulative Therapy Evaluation is: Low complexity.    Previous and current functional limitations:  (See Goal Flow Sheet for this information)    Short term and Long term goals: (See Goal Flow Sheet for this information)     Communication ability:  Patient appears to be able to clearly communicate and understand verbal and written communication and follow directions correctly.  Treatment Explanation - The following has been discussed with the patient:   RX ordered/plan of care  Anticipated outcomes  Possible risks and side effects  This patient would benefit from PT intervention to resume normal activities.   Rehab potential is good.    Frequency:  1 X week, once daily  Duration:  for 4 weeks; tapering to 2x/month for 1 month  Discharge Plan:  Achieve all LTG.  Independent in home  treatment program.  Reach maximal therapeutic benefit.    Please refer to the daily flowsheet for treatment today, total treatment time and time spent performing 1:1 timed codes.

## 2021-06-15 ENCOUNTER — THERAPY VISIT (OUTPATIENT)
Dept: PHYSICAL THERAPY | Facility: CLINIC | Age: 56
End: 2021-06-15
Payer: COMMERCIAL

## 2021-06-15 DIAGNOSIS — M54.50 CHRONIC LEFT-SIDED LOW BACK PAIN WITHOUT SCIATICA: ICD-10-CM

## 2021-06-15 DIAGNOSIS — M54.16 LUMBAR RADICULOPATHY: ICD-10-CM

## 2021-06-15 DIAGNOSIS — G89.29 CHRONIC LEFT-SIDED LOW BACK PAIN WITHOUT SCIATICA: ICD-10-CM

## 2021-06-15 PROCEDURE — 97110 THERAPEUTIC EXERCISES: CPT | Mod: GP | Performed by: PHYSICAL THERAPIST

## 2021-07-19 ENCOUNTER — THERAPY VISIT (OUTPATIENT)
Dept: PHYSICAL THERAPY | Facility: CLINIC | Age: 56
End: 2021-07-19
Payer: COMMERCIAL

## 2021-07-19 DIAGNOSIS — M54.50 CHRONIC LEFT-SIDED LOW BACK PAIN WITHOUT SCIATICA: ICD-10-CM

## 2021-07-19 DIAGNOSIS — M54.16 LUMBAR RADICULOPATHY: ICD-10-CM

## 2021-07-19 DIAGNOSIS — G89.29 CHRONIC LEFT-SIDED LOW BACK PAIN WITHOUT SCIATICA: ICD-10-CM

## 2021-07-19 PROCEDURE — 97110 THERAPEUTIC EXERCISES: CPT | Mod: GP | Performed by: PHYSICAL THERAPIST

## 2021-12-27 ENCOUNTER — IMMUNIZATION (OUTPATIENT)
Dept: NURSING | Facility: CLINIC | Age: 56
End: 2021-12-27
Payer: COMMERCIAL

## 2021-12-27 PROCEDURE — 91300 PR COVID VAC PFIZER DIL RECON 30 MCG/0.3 ML IM: CPT

## 2021-12-27 PROCEDURE — 0004A PR COVID VAC PFIZER DIL RECON 30 MCG/0.3 ML IM: CPT

## 2022-09-23 ENCOUNTER — IMMUNIZATION (OUTPATIENT)
Dept: FAMILY MEDICINE | Facility: CLINIC | Age: 57
End: 2022-09-23
Payer: COMMERCIAL

## 2022-09-23 DIAGNOSIS — Z23 HIGH PRIORITY FOR 2019-NCOV VACCINE: Primary | ICD-10-CM

## 2022-09-23 PROCEDURE — 0124A COVID-19,PF,PFIZER BOOSTER BIVALENT: CPT

## 2022-09-23 PROCEDURE — 91312 COVID-19,PF,PFIZER BOOSTER BIVALENT: CPT

## 2022-09-23 PROCEDURE — 99207 PR NO CHARGE NURSE ONLY: CPT

## 2022-12-27 NOTE — PROGRESS NOTES
"    History of Present Illness       Reason for visit:  Right knee  Symptom onset:  More than a month  Symptom intensity:  Moderate  Symptom progression:  Staying the same  Had these symptoms before:  Yes  Has tried/received treatment for these symptoms:  No  What makes it worse:  Running  What makes it better:  Ice    He eats 2-3 servings of fruits and vegetables daily.He consumes 1 sweetened beverage(s) daily.He exercises with enough effort to increase his heart rate 60 or more minutes per day.  He exercises with enough effort to increase his heart rate 4 days per week.   He is taking medications regularly.     SUBJECTIVE:  Jaguar Liu is a 57 year old male who presents to the clinic today for right knee pain.  Onset of symptoms:   months ago.  History of injury: none  Associated symptoms: swelling:   Location of pain: anterior  Symptoms are: Unchanged  History of serious knee problems: no  Medications and therapies tried: cut back onrunning but has  Not helped  What makes it worse:  ?    Past Medical, social, family histories, medications, and allergies reviewed and updated    OBJECTIVE:  Blood pressure 138/75, pulse 79, temperature (!) 96.5  F (35.8  C), temperature source Tympanic, resp. rate 16, height 1.765 m (5' 9.5\"), weight 94.3 kg (208 lb), SpO2 98 %.  Patient appears to be in alert and in no apparent distress distress  KNEE EXAM (right)  Effusion: yes  Erythema: no  Patellar tenderness: yes  Medial joint line tenderness: no  Lateral joint line tenderness: no  Lachman's test: negative  Nancy's maneuver: negative  Valgus:negative  Varus:negative  range of motion: full  Calves soft non-tender.  Neurovascularly Intact Distally.      X-rays: normal: no effusions, fractures, spurring, joint space narrowing or loose bodies    ICD-10-CM    1. Chronic pain of right knee  M25.561 XR Knee Right 1/2 Views    G89.29 ibuprofen (ADVIL/MOTRIN) 800 MG tablet     Orthopedic  Referral       PLAN:as above   "

## 2022-12-28 ENCOUNTER — ANCILLARY PROCEDURE (OUTPATIENT)
Dept: GENERAL RADIOLOGY | Facility: CLINIC | Age: 57
End: 2022-12-28
Attending: FAMILY MEDICINE
Payer: COMMERCIAL

## 2022-12-28 ENCOUNTER — OFFICE VISIT (OUTPATIENT)
Dept: FAMILY MEDICINE | Facility: CLINIC | Age: 57
End: 2022-12-28
Payer: COMMERCIAL

## 2022-12-28 VITALS
HEART RATE: 79 BPM | HEIGHT: 70 IN | RESPIRATION RATE: 16 BRPM | DIASTOLIC BLOOD PRESSURE: 75 MMHG | TEMPERATURE: 96.5 F | OXYGEN SATURATION: 98 % | BODY MASS INDEX: 29.78 KG/M2 | SYSTOLIC BLOOD PRESSURE: 138 MMHG | WEIGHT: 208 LBS

## 2022-12-28 DIAGNOSIS — G89.29 CHRONIC PAIN OF RIGHT KNEE: Primary | ICD-10-CM

## 2022-12-28 DIAGNOSIS — G89.29 CHRONIC PAIN OF RIGHT KNEE: ICD-10-CM

## 2022-12-28 DIAGNOSIS — M25.561 CHRONIC PAIN OF RIGHT KNEE: ICD-10-CM

## 2022-12-28 DIAGNOSIS — M25.561 CHRONIC PAIN OF RIGHT KNEE: Primary | ICD-10-CM

## 2022-12-28 PROCEDURE — 99214 OFFICE O/P EST MOD 30 MIN: CPT | Performed by: FAMILY MEDICINE

## 2022-12-28 PROCEDURE — 73560 X-RAY EXAM OF KNEE 1 OR 2: CPT | Mod: TC | Performed by: RADIOLOGY

## 2022-12-28 RX ORDER — IBUPROFEN 800 MG/1
800 TABLET, FILM COATED ORAL EVERY 8 HOURS PRN
Qty: 30 TABLET | Refills: 1 | Status: SHIPPED | OUTPATIENT
Start: 2022-12-28

## 2022-12-28 ASSESSMENT — PAIN SCALES - GENERAL: PAINLEVEL: NO PAIN (0)

## 2023-01-30 ENCOUNTER — TELEPHONE (OUTPATIENT)
Dept: FAMILY MEDICINE | Facility: CLINIC | Age: 58
End: 2023-01-30
Payer: COMMERCIAL

## 2023-01-30 DIAGNOSIS — G89.29 CHRONIC PAIN OF RIGHT KNEE: Primary | ICD-10-CM

## 2023-01-30 DIAGNOSIS — M25.561 CHRONIC PAIN OF RIGHT KNEE: Primary | ICD-10-CM

## 2023-01-30 NOTE — TELEPHONE ENCOUNTER
Order/Referral Request    Who is requesting: pt    Orders being requested: referral for knee    Reason service is needed/diagnosis: knee    When are orders needed by: asap    Has this been discussed with Provider: Yes    Does patient have a preference on a Group/Provider/Facility? [facility]    Does patient have an appointment scheduled?: No    Where to send orders: N/A    Okay to leave a detailed message?: Yes at Cell number on file:    Telephone Information:   Mobile [phone]     Ivelisse Martinez

## 2023-02-27 ENCOUNTER — TELEPHONE (OUTPATIENT)
Dept: FAMILY MEDICINE | Facility: CLINIC | Age: 58
End: 2023-02-27

## 2023-02-27 ENCOUNTER — OFFICE VISIT (OUTPATIENT)
Dept: ORTHOPEDICS | Facility: CLINIC | Age: 58
End: 2023-02-27
Attending: FAMILY MEDICINE
Payer: COMMERCIAL

## 2023-02-27 VITALS
DIASTOLIC BLOOD PRESSURE: 92 MMHG | BODY MASS INDEX: 29.78 KG/M2 | WEIGHT: 208 LBS | RESPIRATION RATE: 18 BRPM | HEART RATE: 75 BPM | SYSTOLIC BLOOD PRESSURE: 168 MMHG | HEIGHT: 70 IN

## 2023-02-27 DIAGNOSIS — M25.561 CHRONIC PAIN OF RIGHT KNEE: ICD-10-CM

## 2023-02-27 DIAGNOSIS — G89.29 CHRONIC PAIN OF RIGHT KNEE: ICD-10-CM

## 2023-02-27 DIAGNOSIS — M22.41 CHONDROMALACIA OF RIGHT PATELLA: ICD-10-CM

## 2023-02-27 PROCEDURE — 99203 OFFICE O/P NEW LOW 30 MIN: CPT | Performed by: ORTHOPAEDIC SURGERY

## 2023-02-27 ASSESSMENT — KOOS JR
STRAIGHTENING KNEE FULLY: MODERATE
KOOS JR SCORING: 52.47
TWISING OR PIVOTING ON KNEE: MODERATE
HOW SEVERE IS YOUR KNEE STIFFNESS AFTER FIRST WAKING IN MORNING: MODERATE
STANDING UPRIGHT: MODERATE
BENDING TO THE FLOOR TO PICK UP OBJECT: MODERATE
GOING UP OR DOWN STAIRS: MODERATE
RISING FROM SITTING: MODERATE

## 2023-02-27 NOTE — PATIENT INSTRUCTIONS
Jaguar to follow up with Primary Care provider regarding elevated blood pressure.    Diagnosis  Chondromalacia patella.  Glucosamine 1500 mg/day and chondroitin sulfate 1200 mg/day advised.  Avoid kneeling and crawling.  Do quadriceps strengthening (especially VMO) .  Do hip abduction strengthening.  Use anti-inflammatories as needed.                    Patellofemoral Pain Syndrome (Runner's Knee)             What is patellofemoral pain syndrome?   Patellofemoral pain syndrome is pain behind the kneecap. It may also be called patellofemoral disorder, patellar malalignment, runner's knee, and chondromalacia.   How does it occur?   Patellofemoral pain syndrome can occur from overuse of the knee in sports and activities such as running, walking, jumping, or bicycling.   The kneecap (patella) is attached to the large group of muscles in the thigh called the quadriceps. It is also attached to the shin bone by the patellar tendon. The kneecap fits into grooves in the end of the thigh bone (femur) called the femoral condyle. With repeated bending and straightening of the knee, you can irritate the inside surface of the kneecap and cause pain.   Patellofemoral pain syndrome also may result from the way your hips, legs, knees, or feet are aligned. For example, if you have wide hips or underdeveloped thigh muscles, or if you are knock-kneed You may also have this problem if your foot flattens too much when you walk or run (a condition called over-pronation).   What are the symptoms?   The main symptom is pain behind the kneecap. You may have pain when you walk, run, or sit for a long time. The pain is usually worse when you walk downhill or down stairs. Your knee may swell at times. You may feel or hear snapping, popping, or grinding in the knee.   How is it diagnosed?   Your healthcare provider will review your symptoms and examine your knee. You will have knee X-rays. You may have an MRI to check for damage to the surface  of the patella or femur or another injury.   How is it treated?   Treatment includes the following:   Put an ice pack, gel pack, or package of frozen vegetables, wrapped in a cloth on the area every 3 to 4 hours, for up to 20 minutes at a time.   Raise the knee on a pillow when you sit or lie down.   Take an anti-inflammatory medicine such as ibuprofen, or other medicine as directed by your provider. Nonsteroidal anti-inflammatory medicines (NSAIDs) may cause stomach bleeding and other problems. These risks increase with age. Read the label and take as directed. Unless recommended by your healthcare provider, do not take for more than 10 days.   Follow your provider's instructions for doing exercises to help you recover. Your healthcare provider will show you exercises to help decrease the pain behind your kneecap.   If you over-pronate, your healthcare provider may recommend shoe inserts, called orthotics. You can buy orthotics at a pharmacy or athletic shoe store or they can be custom-made.   Use an infrapatellar strap, a strap placed below the kneecap over the patellar tendon.   Wear a neoprene knee sleeve, which will give support to your knee and patella.   While you recover from your injury, you will need to change your sport or activity to one that does not make your condition worse. For example, you may need to bicycle or swim instead of run.   In cases of severe patellofemoral pain syndrome, surgery may be recommended.   How long will the effects last?   Patellofemoral pain often lasts a long time and can come back after symptoms were better for a while. Treatment requires proper rehabilitation exercises that are done regularly.   When can I return to my normal activities?   Everyone recovers from an injury at a different rate. Return to your activities depends on how soon your knee recovers, not by how many days or weeks it has been since your injury has occurred. In general, the longer you have symptoms  before you start treatment, the longer it will take to get better. The goal is to return you to your normal activities as soon as is safely possible. If you return too soon you may worsen your injury.   You may safely return to your normal activities when, starting from the top of the list and progressing to the end, each of the following is true:   Your injured knee can be fully straightened and bent without pain.   Your knee and leg have regained normal strength compared to the uninjured knee and leg.   You are able to walk, bend, and squat without pain.   How can I prevent runner's knee?   Runner's knee can best be prevented by strengthening your thigh muscles, particularly the inside part of this muscle group. It is also important to wear shoes that fit well and that have good arch supports.     Published by [employer].  This content is reviewed periodically and is subject to change as new health information becomes available. The information is intended to inform and educate and is not a replacement for medical evaluation, advice, diagnosis or treatment by a healthcare professional.   Written by Malachi Winters MD, for [employer]   ? 2010 [employer] and/or its affiliates. All Rights Reserved.   Copyright   [employer] 2011  Adult Health Advisor    Patellofemoral Pain Syndrome (Runner's Knee) Rehabilitation Exercises              You can do the hamstring stretch right away. When the pain in your knee has decreased, you can do the quadriceps stretch and start strengthening the thigh muscles using the rest of the exercises.   Standing hamstring stretch: Put the heel of the leg on your injured side on a stool about 15 inches high. Keep your leg straight. Lean forward, bending at the hips, until you feel a mild stretch in the back of your thigh. Make sure you don't roll your shoulders or bend at the waist when doing this or you will stretch your lower back instead of your leg. Hold the stretch for  15 to 30 seconds. Repeat 3 times.   Quadriceps stretch: Stand an arm's length away from the wall with your injured leg farthest from the wall. Facing straight ahead, brace yourself by keeping one hand against the wall. With your other hand, grasp the ankle of your injured leg and pull your heel toward your buttocks. Don't arch or twist your back. Keep your knees together. Hold this stretch for 15 to 30 seconds.   Side-lying leg lift: Lie on your uninjured side. Tighten the front thigh muscles on your injured leg and lift that leg 8 to 10 inches away from the other leg. Keep the leg straight and lower it slowly. Do 3 sets of 10.   Quad sets: Sit on the floor with your injured leg straight and your other leg bent. Press the back of the knee of your injured leg against the floor by tightening the muscles on the top of your thigh. Hold this position 10 seconds. Relax. Do 3 sets of 10.   Straight leg raise: Lie on your back with your legs straight out in front of you. Bend the knee on your uninjured side and place the foot flat on the floor. Tighten the thigh muscle on your injured side and lift your leg about 8 inches off the floor. Keep your leg straight and your thigh muscle tight. Slowly lower your leg back down to the floor. Do 3 sets of 10.   Step-up: Stand with the foot of your injured leg on a support 3 to 5 inches high (like a small step or block of wood). Keep your other foot flat on the floor. Shift your weight onto the injured leg on the support. Straighten your injured leg as the other leg comes off the floor. Return to the starting position by bending your injured leg and slowly lowering your uninjured leg back to the floor. Do 3 sets of 10.   Wall squat with a ball: Stand with your back, shoulders, and head against a wall. Look straight ahead. Keep your shoulders relaxed and your feet 3 feet from the wall and shoulder's width apart. Place a soccer or basketball-sized ball behind your back. Keeping your  back against the wall, slowly squat down to a 45-degree angle. Your thighs will not yet be parallel to the floor. Hold this position for 10 seconds and then slowly slide back up the wall. Repeat 10 times. Build up to 3 sets of 10.   Knee stabilization: Wrap a piece of elastic tubing around the ankle of the uninjured leg. Tie a knot in the other end of the tubing and close it in a door.   Stand facing the door on the leg without tubing and bend your knee slightly, keeping your thigh muscles tight. While maintaining this position, move the leg with the tubing straight back behind you. Do 3 sets of 10.   Turn 90 degrees so the leg without tubing is closest to the door. Move the leg with tubing away from your body. Do 3 sets of 10.   Turn 90 degrees again so your back is to the door. Move the leg with tubing straight out in front of you. Do 3 sets of 10.   Turn your body 90 degrees again so the leg with tubing is closest to the door. Move the leg with tubing across your body. Do 3 sets of 10.   Hold onto a chair if you need help balancing. This exercise can be made even more challenging by standing on a pillow while you move the leg with tubing.   Resisted terminal knee extension: Make a loop from a piece of elastic tubing by tying a knot in both ends. Close both knots in a door. Step into the loop so the tubing is around the back of your injured leg. Lift the other foot off the ground. Hold onto a chair for balance, if needed. Bend the knee on the leg with tubing about 45 degrees. Slowly straighten your leg, keeping your thigh muscle tight as you do this. Do this 10 times. Do 3 sets. An easier way to do this is to stand on both legs for better support while you do the exercise.   Standing calf stretch: Stand facing a wall with your hands on the wall at about eye level. Keep your injured leg back with your heel on the floor. Keep the other leg forward with the knee bent. Turn your back foot slightly inward (as if you  were pigeon-toed). Slowly lean into the wall until you feel a stretch in the back of your calf. Hold the stretch for 15 to 30 seconds. Return to the starting position. Repeat 3 times. Do this exercise several times each day.   Clam exercise: Lie on your uninjured side with your hips and knees bent and feet together. Slowly raise your top leg toward the ceiling while keeping your heels touching each other. Hold for 2 seconds and lower slowly. Do 3 sets of 10 repetitions.   Iliotibial band stretch: Side-bending: Cross one leg in front of the other leg and lean in the opposite direction from the front leg. Reach the arm on the side of the back leg over your head while you do this. Hold this position for 15 to 30 seconds. Return to the starting position. Repeat 3 times and then switch legs and repeat the exercise.   Published by [employer].  This content is reviewed periodically and is subject to change as new health information becomes available. The information is intended to inform and educate and is not a replacement for medical evaluation, advice, diagnosis or treatment by a healthcare professional.   Written by Shana Lee, MS, PT, and Thais Mixon PT, [facility], [facility], for [employer].   ? 2010 [employer] and/or its affiliates. All Rights Reserved.   Copyright   [employer] 2011  Adult Health Advisor                                    Strengthening exercises:  Start abductor strengthening and begin the exercises demonstrated today.  Leg strengthening:  Hold onto the sink with painful leg toward the sink.  Lift painful leg out to touch the wall with the heel.  Lift 10-15 times, twice a day.

## 2023-02-27 NOTE — TELEPHONE ENCOUNTER
Patient Quality Outreach    Patient is due for the following:   Colon Cancer Screening  Physical Preventive Adult Physical      Topic Date Due     Zoster (Shingles) Vaccine (1 of 2) Never done     Diptheria Tetanus Pertussis (DTAP/TDAP/TD) Vaccine (2 - Td or Tdap) 01/13/2021       Next Steps:   Schedule a Adult Preventative    Type of outreach:    Sent letter.      Questions for provider review:    None     CARL ZUNIGA MA

## 2023-02-27 NOTE — LETTER
2/27/2023         RE: Jaguar Liu  [id number] [facility] [id number]        Dear Colleague,    Thank you for referring your patient, Jaguar Liu, to the [facility]. Please see a copy of my visit note below.    Jaguar Liu is a 57 year old male who is seen in consultation at the request of Dr. Freddy Marroquin  for right knee pain.  He had pain since August 2022 with no definite history of injury.  He is bothered by running.  He likes to run about 20 miles a week, but has not been able to lately.  He describes sharp aching pain rated 3 out of 10 primarily over the anterior aspect of the knee.  He is used ice and ibuprofen.  He had increased pain with yard work, treadmill use, most physical activities.    X-ray of the right knee on 12/28/2022 showed minimal spurring evident on the patella and posterior femur.  Overall very good joint space is preserved.    Past Medical History:   Diagnosis Date     History of chest pain        Past Surgical History:   Procedure Laterality Date     ARTHROSCOPY KNEE Left 11/11/2016    Procedure: ARTHROSCOPY KNEE;  Surgeon: Freddy Kenney MD;  Location: [facility] OR     ARTHROSCOPY KNEE RT/LT       ECHO STRESS TEST  2011      KNEE SCOPE,MED/LAT MENISECTOMY Left 11/11/16     ROTATOR CUFF REPAIR RT/LT       [facility] EYE SERVICE OR PROCEDURE OPNP       [facility] ORAL SURGERY PROCEDURE         Family History   Problem Relation Age of Onset     Cancer Mother         lung cancer     Cancer Father         lung cancer     Prostate Cancer Brother         age 52       Social History     Socioeconomic History     Marital status: Single     Spouse name: Not on file     Number of children: Not on file     Years of education: Not on file     Highest education level: Not on file   Occupational History     Not on file   Tobacco Use     Smoking status: Never     Smokeless tobacco: Never   Vaping Use     Vaping Use: Never used   Substance and Sexual Activity     Alcohol use: Yes  "    Comment: social drink     Drug use: No     Sexual activity: Yes     Partners: Female   Other Topics Concern     Parent/sibling w/ CABG, MI or angioplasty before 65F 55M? Not Asked   Social History Narrative     Not on file     Social Determinants of Health     Financial Resource Strain: Not on file   Food Insecurity: Not on file   Transportation Needs: Not on file   Physical Activity: Not on file   Stress: Not on file   Social Connections: Not on file   Intimate Partner Violence: Not on file   Housing Stability: Not on file       Current Outpatient Medications   Medication Sig Dispense Refill     cetirizine (ZYRTEC) 10 MG tablet Take 10 mg by mouth 2 times daily        diclofenac (CATAFLAM) 50 MG tablet Take 1 tablet (50 mg) by mouth 3 times daily 90 tablet 0     Esomeprazole Magnesium (NEXIUM PO) Take by mouth daily       ibuprofen (ADVIL/MOTRIN) 800 MG tablet Take 1 tablet (800 mg) by mouth every 8 hours as needed for moderate pain (4-6) 30 tablet 1       No Known Allergies    REVIEW OF SYSTEMS:  CONSTITUTIONAL:  NEGATIVE for fever, chills, change in weight, not feeling tired  SKIN:  NEGATIVE for worrisome rashes, no skin lumps, no skin ulcers and no non-healing wounds  EYES:  NEGATIVE for vision changes or irritation.  ENT/MOUTH:  NEGATIVE.  No hearing loss, no hoarseness, no difficulty swallowing.  RESP:  NEGATIVE. No cough or shortness of breath.  CV:  NEGATIVE for chest pain, palpitations or peripheral edema  GI:  NEGATIVE for nausea, abdominal pain, heartburn, or change in bowel habits  :  Negative. No dysuria, no hematuria  MUSCULOSKELETAL:  See HPI above  NEURO:  NEGATIVE . No headaches, no dizziness,  no numbness  ENDOCRINE:  NEGATIVE for temperature intolerance, skin/hair changes  HEME/ALLERGY/IMMUNE:  NEGATIVE for bleeding problems  PSYCHIATRIC:  NEGATIVE. no anxiety, no depression.      Exam:  Vitals: BP (!) 168/92   Pulse 75   Resp 18   Ht 1.765 m (5' 9.5\")   Wt 94.3 kg (208 lb)   BMI 30.28 " kg/m    BMI= Body mass index is 30.28 kg/m .  Constitutional:  healthy, alert and no distress  Neuro: Alert and Oriented x 3, Sensation grossly WNL.  HEENT:  Atraumatic, EOMI  Neck:  Neck supple with no tenderness.  Psych: Affect normal   Respiratory: Breathing not labored.  Cardiovascular: normal peripheral pulses  Lymph: no adenopathy  Skin: No rashes,worrisome lesions or skin problems  Spine: straight, no straight leg raising pain.  Hips show full range of motion.  There is no tenderness over the sacro-iliac joints, sciatic notch, or greater trochanters.   He has full range of motion of both knees from 0 to 130 degrees.  He had negative medial and lateral Nancy's.  He has mild patellofemoral crepitation on both knees.  He had no tenderness over the IT band at the lateral femur.  He has no ligamentous laxity of MCL, LCL, or cruciates of either knee.  There is no effusion.  Sensation, motor and circulation are intact.    Assessment:  Chondromalacia patella.  Plan: He should avoid kneeling and crawling.  Work on quad strengthening and also hip abduction strengthening.  I have gone over exercises for this today.  If pain continues we could consider steroid injection.  We will      Again, thank you for allowing me to participate in the care of your patient.        Sincerely,        Freddy Kenney MD

## 2023-02-28 NOTE — PROGRESS NOTES
Jaguar Liu is a 57 year old male who is seen in consultation at the request of Dr. Freddy Marroquin  for right knee pain.  He had pain since August 2022 with no definite history of injury.  He is bothered by running.  He likes to run about 20 miles a week, but has not been able to lately.  He describes sharp aching pain rated 3 out of 10 primarily over the anterior aspect of the knee.  He is used ice and ibuprofen.  He had increased pain with yard work, treadmill use, most physical activities.    X-ray of the right knee on 12/28/2022 showed minimal spurring evident on the patella and posterior femur.  Overall very good joint space is preserved.    Past Medical History:   Diagnosis Date     History of chest pain        Past Surgical History:   Procedure Laterality Date     ARTHROSCOPY KNEE Left 11/11/2016    Procedure: ARTHROSCOPY KNEE;  Surgeon: Freddy Kenney MD;  Location: [facility] OR     ARTHROSCOPY KNEE RT/LT       ECHO STRESS TEST  2011     HC KNEE SCOPE,MED/LAT MENISECTOMY Left 11/11/16     ROTATOR CUFF REPAIR RT/LT       [facility] EYE SERVICE OR PROCEDURE OPNP       [facility] ORAL SURGERY PROCEDURE         Family History   Problem Relation Age of Onset     Cancer Mother         lung cancer     Cancer Father         lung cancer     Prostate Cancer Brother         age 52       Social History     Socioeconomic History     Marital status: Single     Spouse name: Not on file     Number of children: Not on file     Years of education: Not on file     Highest education level: Not on file   Occupational History     Not on file   Tobacco Use     Smoking status: Never     Smokeless tobacco: Never   Vaping Use     Vaping Use: Never used   Substance and Sexual Activity     Alcohol use: Yes     Comment: social drink     Drug use: No     Sexual activity: Yes     Partners: Female   Other Topics Concern     Parent/sibling w/ CABG, MI or angioplasty before 65F 55M? Not Asked   Social History Narrative     Not on file     Social  "Determinants of Health     Financial Resource Strain: Not on file   Food Insecurity: Not on file   Transportation Needs: Not on file   Physical Activity: Not on file   Stress: Not on file   Social Connections: Not on file   Intimate Partner Violence: Not on file   Housing Stability: Not on file       Current Outpatient Medications   Medication Sig Dispense Refill     cetirizine (ZYRTEC) 10 MG tablet Take 10 mg by mouth 2 times daily        diclofenac (CATAFLAM) 50 MG tablet Take 1 tablet (50 mg) by mouth 3 times daily 90 tablet 0     Esomeprazole Magnesium (NEXIUM PO) Take by mouth daily       ibuprofen (ADVIL/MOTRIN) 800 MG tablet Take 1 tablet (800 mg) by mouth every 8 hours as needed for moderate pain (4-6) 30 tablet 1       No Known Allergies    REVIEW OF SYSTEMS:  CONSTITUTIONAL:  NEGATIVE for fever, chills, change in weight, not feeling tired  SKIN:  NEGATIVE for worrisome rashes, no skin lumps, no skin ulcers and no non-healing wounds  EYES:  NEGATIVE for vision changes or irritation.  ENT/MOUTH:  NEGATIVE.  No hearing loss, no hoarseness, no difficulty swallowing.  RESP:  NEGATIVE. No cough or shortness of breath.  CV:  NEGATIVE for chest pain, palpitations or peripheral edema  GI:  NEGATIVE for nausea, abdominal pain, heartburn, or change in bowel habits  :  Negative. No dysuria, no hematuria  MUSCULOSKELETAL:  See HPI above  NEURO:  NEGATIVE . No headaches, no dizziness,  no numbness  ENDOCRINE:  NEGATIVE for temperature intolerance, skin/hair changes  HEME/ALLERGY/IMMUNE:  NEGATIVE for bleeding problems  PSYCHIATRIC:  NEGATIVE. no anxiety, no depression.      Exam:  Vitals: BP (!) 168/92   Pulse 75   Resp 18   Ht 1.765 m (5' 9.5\")   Wt 94.3 kg (208 lb)   BMI 30.28 kg/m    BMI= Body mass index is 30.28 kg/m .  Constitutional:  healthy, alert and no distress  Neuro: Alert and Oriented x 3, Sensation grossly WNL.  HEENT:  Atraumatic, EOMI  Neck:  Neck supple with no tenderness.  Psych: Affect normal "   Respiratory: Breathing not labored.  Cardiovascular: normal peripheral pulses  Lymph: no adenopathy  Skin: No rashes,worrisome lesions or skin problems  Spine: straight, no straight leg raising pain.  Hips show full range of motion.  There is no tenderness over the sacro-iliac joints, sciatic notch, or greater trochanters.   He has full range of motion of both knees from 0 to 130 degrees.  He had negative medial and lateral Nancy's.  He has mild patellofemoral crepitation on both knees.  He had no tenderness over the IT band at the lateral femur.  He has no ligamentous laxity of MCL, LCL, or cruciates of either knee.  There is no effusion.  Sensation, motor and circulation are intact.    Assessment:  Chondromalacia patella.  Plan: He should avoid kneeling and crawling.  Work on quad strengthening and also hip abduction strengthening.  I have gone over exercises for this today.  If pain continues we could consider steroid injection.  We will

## 2023-06-02 ENCOUNTER — APPOINTMENT (OUTPATIENT)
Dept: CT IMAGING | Facility: CLINIC | Age: 58
End: 2023-06-02
Attending: EMERGENCY MEDICINE
Payer: COMMERCIAL

## 2023-06-02 ENCOUNTER — DOCUMENTATION ONLY (OUTPATIENT)
Dept: URGENT CARE | Facility: URGENT CARE | Age: 58
End: 2023-06-02
Payer: COMMERCIAL

## 2023-06-02 ENCOUNTER — OFFICE VISIT (OUTPATIENT)
Dept: FAMILY MEDICINE | Facility: CLINIC | Age: 58
End: 2023-06-02
Payer: COMMERCIAL

## 2023-06-02 ENCOUNTER — HOSPITAL ENCOUNTER (EMERGENCY)
Facility: CLINIC | Age: 58
Discharge: HOME | End: 2023-06-03
Attending: EMERGENCY MEDICINE | Admitting: EMERGENCY MEDICINE
Payer: COMMERCIAL

## 2023-06-02 VITALS
TEMPERATURE: 98 F | RESPIRATION RATE: 20 BRPM | HEART RATE: 71 BPM | OXYGEN SATURATION: 97 % | DIASTOLIC BLOOD PRESSURE: 95 MMHG | SYSTOLIC BLOOD PRESSURE: 162 MMHG

## 2023-06-02 VITALS
WEIGHT: 206 LBS | SYSTOLIC BLOOD PRESSURE: 155 MMHG | TEMPERATURE: 98 F | OXYGEN SATURATION: 97 % | HEIGHT: 70 IN | BODY MASS INDEX: 29.49 KG/M2 | RESPIRATION RATE: 20 BRPM | DIASTOLIC BLOOD PRESSURE: 89 MMHG | HEART RATE: 69 BPM

## 2023-06-02 DIAGNOSIS — R41.89 BRAIN FOG: ICD-10-CM

## 2023-06-02 DIAGNOSIS — M54.12 CERVICAL RADICULOPATHY: ICD-10-CM
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
## 2023-06-02 DIAGNOSIS — S13.4XXA INJURY OF NECK, WHIPLASH, INITIAL ENCOUNTER: ICD-10-CM

## 2023-06-02 DIAGNOSIS — M25.511 ACUTE PAIN OF RIGHT SHOULDER: ICD-10-CM

## 2023-06-02 DIAGNOSIS — S06.0X0A CLOSED HEAD INJURY WITH CONCUSSION, WITHOUT LOSS OF CONSCIOUSNESS, INITIAL ENCOUNTER: ICD-10-CM

## 2023-06-02 DIAGNOSIS — R20.2 PARESTHESIA: ICD-10-CM

## 2023-06-02 DIAGNOSIS — R41.3 POOR SHORT TERM MEMORY: ICD-10-CM
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
## 2023-06-02 PROCEDURE — 70450 CT HEAD/BRAIN W/O DYE: CPT

## 2023-06-02 PROCEDURE — 99214 OFFICE O/P EST MOD 30 MIN: CPT

## 2023-06-02 PROCEDURE — 72125 CT NECK SPINE W/O DYE: CPT

## 2023-06-02 PROCEDURE — 99284 EMERGENCY DEPT VISIT MOD MDM: CPT | Mod: 25

## 2023-06-02 RX ORDER — PREDNISONE 20 MG/1
40 TABLET ORAL DAILY
Qty: 10 TABLET | Refills: 0 | Status: SHIPPED | OUTPATIENT
Start: 2023-06-02 | End: 2023-06-07

## 2023-06-02 RX ORDER — CYCLOBENZAPRINE HCL 10 MG
10 TABLET ORAL 3 TIMES DAILY PRN
Qty: 15 TABLET | Refills: 0 | Status: SHIPPED | OUTPATIENT
Start: 2023-06-02 | End: 2023-06-08

## 2023-06-02 RX ORDER — NAPROXEN 500 MG/1
500 TABLET ORAL 2 TIMES DAILY WITH MEALS
Status: CANCELLED | OUTPATIENT
Start: 2023-06-02

## 2023-06-02 ASSESSMENT — ACTIVITIES OF DAILY LIVING (ADL)
ADLS_ACUITY_SCORE: 35
ADLS_ACUITY_SCORE: 33

## 2023-06-02 ASSESSMENT — PAIN SCALES - GENERAL: PAINLEVEL: MODERATE PAIN (5)
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
## 2023-06-03 NOTE — DISCHARGE INSTRUCTIONS
Discharge Instructions  Concussion    You were seen today for signs of a concussion.  The symptoms will vary, depending on the nature of your injury and your health. You may have: headache, confusion, nausea (feel sick to your stomach), vomiting (throwing up) and problems with memory, concentrating, or sleep. You may feel dizzy, irritable, and tired. Children and teens may need help from their parents, teachers, and coaches to watch for symptoms as they recover.    Generally, every Emergency Department visit should have a follow-up clinic visit with either a primary or a specialty clinic/provider. Please follow-up as instructed by your emergency provider today.     Return to the Emergency Department if:  Your headache gets worse or you start to have a really bad headache even with the recommended treatment plan.   You feel drowsier, have growing confusion, or slurred speech.   You keep repeating yourself.   You have strange behavior or are feeling more irritable.   You have a seizure.   You vomit (throw up) more than once.   You have trouble walking.   You have weakness or numbness.  Your neck pain gets worse.   You have a loss of consciousness.   You have blood for fluid coming from your ears or nose.   You have new symptoms or anything that worries you.     Home Care:  Get lots of rest and get enough sleep at night. Take daytime naps or rest if you feel tired.   Limit physical activity and  thinking  activities. These can make symptoms worse.   Physical activities include gym, sports, weight training, running, exercise, and heavy lifting.   Thinking activities include homework, class work, job-related work, and screen time (phone, computer, tablet, TV, and video games).   Stick to a healthy diet and drink lots of fluids. Avoid alcohol.  As symptoms improve, you may slowly return to your daily activities. If symptoms get worse or return, reduce your activity.   Know that it is normal to feel sad or frustrated when  you do not feel right and are less active.     Going Back to Work:  Your care team will tell you when you are ready to return to work.    Limit the amount of work you do soon after your injury. This may speed healing. Take breaks if your symptoms get worse. You should also reduce your physical activity as well as activities that require a lot of thinking until you see your doctor. You may need shorter work days and a lighter workload.  Avoid heavy lifting, working with machinery, driving and working at heights until your symptoms are gone or you are cleared by a provider.    Going Back to School:  If you are still having symptoms, you may need extra help at school.  Tell your teachers and school nurse about your injury and symptoms. Ask them to watch for problems with learning, memory, and concentrating. Symptoms may get worse when you do schoolwork, and you may become more irritable. You may need shorter school days, a reduced workload, and to postpone testing.  Do not drive or take gym class (physical activity) until cleared by a provider.    Returning to Sports:  Never return to play if you have any symptoms. A full recovery will reduce the chances of getting hurt again. Remember, it is better to miss one or two games than a whole season.  You should rest from all physical activity until you see your provider. Generally, if all symptoms have completely cleared, your provider can help guide you to slowly return to sports. If symptoms return or worsen, stop the activity and see your provider.  Important: If you are in an organized sport and under age 18, you will need written consent from a healthcare provider before you return to sports. Typically, this will be your primary care or sports medicine provider. Please make an appointment.    If you were given a prescription for medicine here today, be sure to read all of the information (including the package insert) that comes with your prescription.  This will  include important information about the medicine, its side effects, and any warnings that you need to know about.  The pharmacist who fills the prescription can provide more information and answer questions you may have about the medicine.  If you have questions or concerns that the pharmacist cannot address, please call or return to the Emergency Department.     Remember that you can always come back to the Emergency Department if you are not able to see your regular provider in the amount of time listed above, if you get any new symptoms, or if there is anything that worries you.
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
## 2024-08-24 ENCOUNTER — OFFICE VISIT (OUTPATIENT)
Dept: URGENT CARE | Facility: URGENT CARE | Age: 59
End: 2024-08-24
Payer: COMMERCIAL

## 2024-08-24 VITALS
OXYGEN SATURATION: 98 % | RESPIRATION RATE: 12 BRPM | TEMPERATURE: 97.9 F | BODY MASS INDEX: 29.41 KG/M2 | SYSTOLIC BLOOD PRESSURE: 138 MMHG | DIASTOLIC BLOOD PRESSURE: 83 MMHG | HEART RATE: 69 BPM | WEIGHT: 206.6 LBS

## 2024-08-24 DIAGNOSIS — M54.16 LUMBAR RADICULOPATHY: Primary | ICD-10-CM

## 2024-08-24 LAB
ANION GAP SERPL CALCULATED.3IONS-SCNC: 9 MMOL/L (ref 7–15)
BUN SERPL-MCNC: 23.9 MG/DL (ref 8–23)
CALCIUM SERPL-MCNC: 9.4 MG/DL (ref 8.8–10.4)
CHLORIDE SERPL-SCNC: 108 MMOL/L (ref 98–107)
CREAT SERPL-MCNC: 0.85 MG/DL (ref 0.67–1.17)
EGFRCR SERPLBLD CKD-EPI 2021: >90 ML/MIN/1.73M2
GLUCOSE SERPL-MCNC: 106 MG/DL (ref 70–99)
HCO3 SERPL-SCNC: 27 MMOL/L (ref 22–29)
POTASSIUM SERPL-SCNC: 4.7 MMOL/L (ref 3.4–5.3)
SODIUM SERPL-SCNC: 144 MMOL/L (ref 135–145)

## 2024-08-24 PROCEDURE — 99213 OFFICE O/P EST LOW 20 MIN: CPT | Performed by: PHYSICIAN ASSISTANT

## 2024-08-24 PROCEDURE — 36415 COLL VENOUS BLD VENIPUNCTURE: CPT | Performed by: PHYSICIAN ASSISTANT

## 2024-08-24 PROCEDURE — 80048 BASIC METABOLIC PNL TOTAL CA: CPT | Performed by: PHYSICIAN ASSISTANT

## 2024-08-24 RX ORDER — METHOCARBAMOL 750 MG/1
750 TABLET, FILM COATED ORAL 3 TIMES DAILY
Qty: 50 TABLET | Refills: 0 | Status: SHIPPED | OUTPATIENT
Start: 2024-08-24

## 2024-08-24 NOTE — PATIENT INSTRUCTIONS
Follow up with Primary Care for a physical and additional refills.   Take Diclofenac up to three times per day with food.   Take Methocarbamol up to three times daily.   You will get phone call with your lab results. We're mainly just checking your kidney function since these meds go through the kidneys.    I believe this was your classic migraine headache that you have had in a while.  Please come back to the ER if you get any increased pain fever numbness tingling weakness is anything else.

## 2024-08-24 NOTE — PROGRESS NOTES
Patient presents with:  Back Pain: Sciatic nerve flare up  Would like refills of diclofenac and methocarbamol      Clinical Decision Making:  Flareup of lumbar radiculopathy.  Patient previously tolerated methocarbamol and diclofenac for this.  No findings concerning for cauda equina syndrome.  These medications were refilled today, but I did recommend updating basic metabolic panel since last 1 was in 2015.  Patient is agreeable this plan.  Patient will follow-up with primary care for physical since he is overdue.      ICD-10-CM    1. Lumbar radiculopathy  M54.16 Basic metabolic panel  (Ca, Cl, CO2, Creat, Gluc, K, Na, BUN)     methocarbamol (ROBAXIN) 750 MG tablet     diclofenac (VOLTAREN) 50 MG EC tablet          Patient Instructions   Follow up with Primary Care for a physical and additional refills.   Take Diclofenac up to three times per day with food.   Take Methocarbamol up to three times daily.   You will get phone call with your lab results. We're mainly just checking your kidney function since these meds go through the kidneys.     HPI:  Jaguar Liu is a 58 year old male with past medical history of lumbar radiculopathy who presents today with concerns of sciatic nerve flareup.  Patient is requesting refills of diclofenac and methocarbamol.  Pain radiates into the left leg.  No loss of control of bowel or bladder function.  No recent trauma.  Per chart review last metabolic panel that showed normal kidney function was in 2015.    History obtained from the patient.    Problem List:  2023-02: Chondromalacia of right patella  2021-06: Chronic left-sided low back pain without sciatica  2021-06: Lumbar radiculopathy  2016-11: S/P arthroscopy of left knee  2016-09: Tear of medial meniscus of left knee  2016-09: Patellofemoral arthritis  2016-09: Internal derangement of knee, left  2015-01: FHx: prostate cancer  2010-10: CARDIOVASCULAR SCREENING; LDL GOAL LESS THAN 160      Past Medical History:   Diagnosis  Date    History of chest pain        Social History     Tobacco Use    Smoking status: Never    Smokeless tobacco: Never   Substance Use Topics    Alcohol use: Yes     Comment: social drink         Review of Systems    Vitals:    08/24/24 0909   BP: 138/83   Pulse: 69   Resp: 12   Temp: 97.9  F (36.6  C)   TempSrc: Oral   SpO2: 98%   Weight: 93.7 kg (206 lb 9.6 oz)       Physical Exam  Vitals and nursing note reviewed.   Constitutional:       General: He is not in acute distress.     Appearance: He is not toxic-appearing or diaphoretic.   HENT:      Head: Normocephalic and atraumatic.      Right Ear: External ear normal.      Left Ear: External ear normal.   Eyes:      Conjunctiva/sclera: Conjunctivae normal.   Pulmonary:      Effort: Pulmonary effort is normal. No respiratory distress.   Neurological:      Mental Status: He is alert.   Psychiatric:         Mood and Affect: Mood normal.         Behavior: Behavior normal.         Thought Content: Thought content normal.         Judgment: Judgment normal.         At the end of the encounter, I discussed results, diagnosis, medications. Discussed red flags for immediate return to clinic/ER, as well as indications for follow up if no improvement. Patient understood and agreed to plan. Patient was stable for discharge.

## 2024-10-17 ENCOUNTER — OFFICE VISIT (OUTPATIENT)
Dept: FAMILY MEDICINE | Facility: CLINIC | Age: 59
End: 2024-10-17
Payer: COMMERCIAL

## 2024-10-17 VITALS
TEMPERATURE: 96.6 F | RESPIRATION RATE: 16 BRPM | HEART RATE: 76 BPM | HEIGHT: 69 IN | SYSTOLIC BLOOD PRESSURE: 138 MMHG | BODY MASS INDEX: 30.21 KG/M2 | OXYGEN SATURATION: 97 % | DIASTOLIC BLOOD PRESSURE: 80 MMHG | WEIGHT: 204 LBS

## 2024-10-17 DIAGNOSIS — M54.16 LUMBAR RADICULOPATHY: Primary | ICD-10-CM

## 2024-10-17 PROCEDURE — G2211 COMPLEX E/M VISIT ADD ON: HCPCS | Performed by: PHYSICIAN ASSISTANT

## 2024-10-17 PROCEDURE — 99213 OFFICE O/P EST LOW 20 MIN: CPT | Performed by: PHYSICIAN ASSISTANT

## 2024-10-17 RX ORDER — PREDNISONE 20 MG/1
40 TABLET ORAL DAILY
Qty: 10 TABLET | Refills: 0 | Status: SHIPPED | OUTPATIENT
Start: 2024-10-17 | End: 2024-10-22

## 2024-10-17 RX ORDER — CYCLOBENZAPRINE HCL 10 MG
10 TABLET ORAL 3 TIMES DAILY PRN
Qty: 30 TABLET | Refills: 1 | Status: SHIPPED | OUTPATIENT
Start: 2024-10-17

## 2024-10-17 ASSESSMENT — PAIN SCALES - GENERAL: PAINLEVEL: SEVERE PAIN (6)

## 2024-10-17 NOTE — PROGRESS NOTES
Assessment & Plan       ICD-10-CM    1. Lumbar radiculopathy  M54.16 predniSONE (DELTASONE) 20 MG tablet     cyclobenzaprine (FLEXERIL) 10 MG tablet        Talk to patient about his concerns we did talk about side effects of muscle relaxants causing drowsiness.  He is interested in trying something else.  Will give him Flexeril along with prednisone.  Warning signs side effects were discussed.  I did offer him a Toradol shot in clinic today.  He deferred for now.  He states again continue work on physical therapy exercises that he has been given in the past at home.  We talked about updating x-ray and MRI in the future if he is not improving.  Follow-up 4 weeks as needed.  The longitudinal plan of care for the diagnosis(es)/condition(s) as documented were addressed during this visit. Due to the added complexity in care, I will continue to support Jaguar in the subsequent management and with ongoing continuity of care.  Subjective   Jaguar is a 59 year old, presenting for the following health issues:  Pain and Recheck Medication        10/17/2024    11:32 AM   Additional Questions   Roomed by mikie   Accompanied by self         10/17/2024    11:32 AM   Patient Reported Additional Medications   Patient reports taking the following new medications no     History of Present Illness       Back Pain:  He presents for follow up of back pain. Patient's back pain is a recurring problem.  Location of back pain:  Left lower back, left buttock and left side of waist  Description of back pain: dull ache and shooting  Back pain spreads: left buttocks, left thigh and left knee    Since patient first noticed back pain, pain is: always present, but gets better and worse  Does back pain interfere with his job:  Not applicable       He eats 2-3 servings of fruits and vegetables daily.He consumes 1 sweetened beverage(s) daily.He exercises with enough effort to increase his heart rate 30 to 60 minutes per day.  He exercises with  "enough effort to increase his heart rate 3 or less days per week.   He is taking medications regularly.  History of low back pain: Left lower back pain that stared in August. Plays golf. Increase pain this then. Pain down left leg. Pain with range of motion of back. Some numbness/tingling in left leg.   Tx: Methocarbamol and diclofenac.  Physical therapy exercises that he has been given in the past.  He is looking for muscle relaxant that may be not be as drowsy for him.  He states the diclofenac did not help much.  He is looking for something to help with the pain.  Pain is about a 6 out of 10.    Review of Systems  Constitutional, HEENT, cardiovascular, pulmonary, gi and gu systems are negative, except as otherwise noted.      Objective    /80   Pulse 76   Temp (!) 96.6  F (35.9  C) (Tympanic)   Resp 16   Ht 1.753 m (5' 9\")   Wt 92.5 kg (204 lb)   SpO2 97%   BMI 30.13 kg/m    Body mass index is 30.13 kg/m .  Physical Exam   GENERAL: alert and no distress  RESP: lungs clear to auscultation - no rales, rhonchi or wheezes  CV: regular rate and rhythm, normal S1 S2, no S3 or S4, no murmur, click or rub, no peripheral edema  ABDOMEN: soft, nontender, no hepatosplenomegaly, no masses and bowel sounds normal  Exam of his low back he does not elicit any tenderness on exam today.  He does describe the pain as starting in his left buttock and going down his leg.  He has full range of motion bilateral lower extremities with 5-5 strength.  He has positive straight leg raise on the left.  Calves are soft nontender is neuro vas intact distally.          Signed Electronically by: Otto Leung PA-C    "

## 2024-10-23 ENCOUNTER — TELEPHONE (OUTPATIENT)
Dept: FAMILY MEDICINE | Facility: CLINIC | Age: 59
End: 2024-10-23
Payer: COMMERCIAL

## 2024-10-23 DIAGNOSIS — M54.16 LUMBAR RADICULOPATHY: Primary | ICD-10-CM

## 2024-10-23 NOTE — TELEPHONE ENCOUNTER
I left a detailed message with the information below.  I left [phone] if he had any questions.  Thank you. Shana Buckner R.N.    Diagnoses: Lumbar radiculopathy   Order: Spine  Referral   [phone]     Imaging [phone]

## 2024-10-23 NOTE — TELEPHONE ENCOUNTER
Provider: Are you willing to order further imaging for Jaguar?  Thank you. Shana Buckner R.N.    Patient reports that he was seen by Otto Leung PA-C on 10/17/2024 last week for his back.  He was given some medication (prednisone and flexeril) and that has not helped.  It was Jaguar's understanding that imaging may be next.  He is asking if Otto Leung PA-C can order this imaging or does he need to be seen.     Patient reports that the tingling and numbness that he was experiencing at the appointment has gotten significantly worse than when he was seen. The prednisone did not help at all. He can walk and use the leg, but he walks with a limp. He was doing this at the appointment also.     Denies: loss or bowel or bladder control, weakness in that leg where he is dragging it.     Ok to leave a message with the provider's response.    Per OV note dated 10/17/2024 from  Otto Leung PA-C is as follows:   Talk to patient about his concerns we did talk about side effects of muscle relaxants causing drowsiness.  He is interested in trying something else.  Will give him Flexeril along with prednisone.  Warning signs side effects were discussed.  I did offer him a Toradol shot in clinic today.  He deferred for now.  He states again continue work on physical therapy exercises that he has been given in the past at home.  We talked about updating x-ray and MRI in the future if he is not improving.  Follow-up 4 weeks as needed.  The longitudinal plan of care for the diagnosis(es)/condition(s) as documented were addressed during this visit. Due to the added complexity in care, I will continue to support Jaguar in the subsequent management and with ongoing continuity of care.

## 2024-10-23 NOTE — TELEPHONE ENCOUNTER
Ok to update xray and MRI and follow up  with ortho.   Orders placed.     Please inform patient and help schedule imaging.     Thanks    Otto Leung PA-C

## 2024-10-28 ENCOUNTER — TELEPHONE (OUTPATIENT)
Dept: FAMILY MEDICINE | Facility: CLINIC | Age: 59
End: 2024-10-28
Payer: COMMERCIAL

## 2024-11-06 ENCOUNTER — TRANSFERRED RECORDS (OUTPATIENT)
Dept: HEALTH INFORMATION MANAGEMENT | Facility: CLINIC | Age: 59
End: 2024-11-06
Payer: COMMERCIAL

## 2024-11-08 ENCOUNTER — TELEPHONE (OUTPATIENT)
Dept: FAMILY MEDICINE | Facility: CLINIC | Age: 59
End: 2024-11-08
Payer: COMMERCIAL

## 2024-11-08 NOTE — TELEPHONE ENCOUNTER
New Medication Request        What medication are you requesting?: Medication for Malaria and Typhoid    Reason for medication request: Traveling     Have you taken this medication before?: No    Controlled Substance Agreement on file:   CSA -- Patient Level:    CSA: None found at the patient level.         Patient offered an appointment? No    Preferred Pharmacy:   [facility] EMELINA Dejesus - [address]  [address]  [place] [address]  Phone: [phone] Fax: [phone]      Okay to leave a detailed message?: Yes at Home number on file [phone] (home)

## 2024-11-11 ENCOUNTER — TELEPHONE (OUTPATIENT)
Dept: FAMILY MEDICINE | Facility: CLINIC | Age: 59
End: 2024-11-11
Payer: COMMERCIAL

## 2024-11-11 NOTE — TELEPHONE ENCOUNTER
I would recommend that he contact the travel clinic.  What prophylaxis for malaria is dependent on where and when he is going. It changes often and the travel clinic will give him the most up to date medication.  Also, I do not believe we carry typhoid immunizations.

## 2024-11-11 NOTE — TELEPHONE ENCOUNTER
1st attempt to reach pt. Left VM to please return a call to [phone].    When pt returns call: Please relay provider message below.  I would recommend that he contact the travel clinic.  What prophylaxis for malaria is dependent on where and when he is going. It changes often and the travel clinic will give him the most up to date medication.  Also, I do not believe we carry typhoid immunizations.    Thank you - Erinn Vera, ELYSIAN, RN

## 2024-11-11 NOTE — TELEPHONE ENCOUNTER
Patient called to clinic to get his results from his MRI Lumbar Spine and Lumbar X-ray that he had done at [facility] in [place] recently. Order was faxed to them back on 10/28/24.  Patient would like to know of results and interpretation, notes he hasn't heard anything from anyone.    Can team please outreach to [facility] in [place] and get these imaging results on this patient? (If haven't already) Please give to ordering provider, Otto Leung, for review and advisement. Unable to locate any records in Epic chart from [facility] anytime recently.   Thank you!        Margy Vicente RN  Clinical Triage/Primary Care  [facility]

## 2024-11-12 NOTE — TELEPHONE ENCOUNTER
Patient called back. Notified him of provider's recommendations as noted below. Patient verbalized understanding & had no further questions/concerns at this time.     Pamela Velasquez, ELYSIAN, RN   [facility]

## 2024-11-13 NOTE — TELEPHONE ENCOUNTER
Relayed provider message. Provided pt with phone number to schedule with spine specialist. No additional questions at this time.     Thank you - ELYSIA NicoleN, RN

## 2024-11-13 NOTE — TELEPHONE ENCOUNTER
Left message on answering machine for patient to call back to [phone].    RN please give patient provider message as written.  Spine provider scheduling number is [phone].   Melisa NAIDUN, RN

## 2024-11-13 NOTE — TELEPHONE ENCOUNTER
Please scan in MRI report.   Please let patient know that he had a herniated disc in his back and needs to follow up with the specialist.     Otto Leung PA-C

## 2024-11-13 NOTE — TELEPHONE ENCOUNTER
I have scanned MRI report into chart- are you able to call with message from PCP in case any questions?        Tonio Martinez       Please let patient know it will take several weeks to resolve. I can not re-treat until 1 month. It is normal to have no change in just 1 day.     Ruddy Hickey PA-C  [facility]

## 2024-11-14 NOTE — TELEPHONE ENCOUNTER
REASON FOR VISIT: LUMBAR pain    DATE OF APPT: 12/18/2024   NOTES (FOR ALL VISITS) STATUS DETAILS   OFF[facility] NOTE [facility], Otto Mcdowell PA-C 10/23/2024   MEDICATION LIST N/A    IMAGING  (FOR ALL VISITS)     XR N/A    MRI (HEAD, NECK, SPINE) Received [facility]  MRI Lumbar spine 11/06/2024   CT (HEAD, NECK, SPINE) N/A

## 2024-12-04 ENCOUNTER — OFFICE VISIT (OUTPATIENT)
Dept: FAMILY MEDICINE | Facility: CLINIC | Age: 59
End: 2024-12-04
Payer: COMMERCIAL

## 2024-12-04 VITALS
HEART RATE: 65 BPM | RESPIRATION RATE: 18 BRPM | TEMPERATURE: 96 F | SYSTOLIC BLOOD PRESSURE: 138 MMHG | HEIGHT: 69 IN | DIASTOLIC BLOOD PRESSURE: 86 MMHG | WEIGHT: 201 LBS | OXYGEN SATURATION: 97 % | BODY MASS INDEX: 29.77 KG/M2

## 2024-12-04 DIAGNOSIS — M54.16 LUMBAR RADICULOPATHY: Primary | ICD-10-CM

## 2024-12-04 DIAGNOSIS — M67.442 DIGITAL MUCINOUS CYST OF FINGER OF LEFT HAND: ICD-10-CM

## 2024-12-04 PROCEDURE — 99213 OFFICE O/P EST LOW 20 MIN: CPT | Performed by: PHYSICIAN ASSISTANT

## 2024-12-04 PROCEDURE — G2211 COMPLEX E/M VISIT ADD ON: HCPCS | Performed by: PHYSICIAN ASSISTANT

## 2024-12-04 RX ORDER — TRAMADOL HYDROCHLORIDE 50 MG/1
50 TABLET ORAL 3 TIMES DAILY PRN
Qty: 10 TABLET | Refills: 0 | Status: SHIPPED | OUTPATIENT
Start: 2024-12-04 | End: 2024-12-07

## 2024-12-04 RX ORDER — METHOCARBAMOL 500 MG/1
500 TABLET, FILM COATED ORAL 3 TIMES DAILY PRN
Qty: 60 TABLET | Refills: 0 | Status: SHIPPED | OUTPATIENT
Start: 2024-12-04

## 2024-12-04 RX ORDER — INDOMETHACIN 50 MG/1
50 CAPSULE ORAL 2 TIMES DAILY WITH MEALS
Qty: 60 CAPSULE | Refills: 0 | Status: SHIPPED | OUTPATIENT
Start: 2024-12-04

## 2024-12-04 ASSESSMENT — PAIN SCALES - GENERAL: PAINLEVEL_OUTOF10: EXTREME PAIN (8)

## 2024-12-04 NOTE — PROGRESS NOTES
Assessment & Plan       ICD-10-CM    1. Lumbar radiculopathy  M54.16 methocarbamol (ROBAXIN) 500 MG tablet     indomethacin (INDOCIN) 50 MG capsule     traMADol (ULTRAM) 50 MG tablet      2. 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  Talk to patient about his concerns.  He was interested in trying different medications to help him with his symptoms until he sees the specialist.  Warning signs and side effects were discussed.  Follow-up as needed.  2.  Based on his symptoms while follow-up with Ortho for second opinion.  The longitudinal plan of care for the diagnosis(es)/condition(s) as documented were addressed during this visit. Due to the added complexity in care, I will continue to support Jaguar in the subsequent management and with ongoing continuity of care.  Subjective   Jaguar is a 59 year old, presenting for the following health issues:  Pain    History of Present Illness       Reason for visit:  Sciatic    He eats 2-3 servings of fruits and vegetables daily.He consumes 1 sweetened beverage(s) daily.He exercises with enough effort to increase his heart rate 20 to 29 minutes per day.  He exercises with enough effort to increase his heart rate 3 or less days per week.   He is taking medications regularly.  Follow up for lumbar radiculopathy.  MRI -11/6/2024  Conclusion is that there is a moderate L4-L5 disc degeneration with a 7 x 8 mm left subarticular disc extrusion.  Transitioning left L5 nerve impingement.  He does have follow-up with the specialist on 18 December.  He is requesting other options for pain management in the meantime.  Continues to have pain worse after sitting to standing and goes down his left leg mostly in his buttock but does radiate down into his left foot at times.    Also:  Left ring finger cyst for years states he cannot wear a ring is started to bother him mostly with golfing and gripping activities.  He denies any numbness or  "tingling.  He would like it removed.    Review of Systems  Constitutional, HEENT, cardiovascular, pulmonary, gi and gu systems are negative, except as otherwise noted.      Objective    /86   Pulse 65   Temp (!) 96  F (35.6  C) (Tympanic)   Resp 18   Ht 1.753 m (5' 9\")   Wt 91.2 kg (201 lb)   SpO2 97%   BMI 29.68 kg/m    Body mass index is 29.68 kg/m .  Physical Exam   GENERAL: alert and no distress  Back exam :full range of motion of bilateral lower extremities with 5 5 strength.  He is straight leg raise positive on the left.  Calves soft nontender is neuro vas intact distally.  Left fourth phalanx he have a large ulnar cyst of the proximal phalanx nontender mobile.          Signed Electronically by: Otto Leung PA-C    "

## 2024-12-10 NOTE — PROGRESS NOTES
"    SUBJECTIVE:  HPI:  Jaguar Liu  Is a 59 year old male who presents for new patient evaluation of low back pain upon referral from BALDEMAR Leung, whose 10/17/2024 note records:  \"History of low back pain: Left lower back pain that stared in August. Plays golf. Increase pain this then. Pain down left leg. Pain with range of motion of back. Some numbness/tingling in left leg.   Tx: Methocarbamol and diclofenac.  Physical therapy exercises that he has been given in the past.  He is looking for muscle relaxant that may be not be as drowsy for him.  He states the diclofenac did not help much.  He is looking for something to help with the pain.  Pain is about a 6 out of 10...  ...Talk to patient about his concerns we did talk about side effects of muscle relaxants causing drowsiness. He is interested in trying something else. Will give him Flexeril along with prednisone. Warning signs side effects were discussed. I did offer him a Toradol shot in clinic today. He deferred for now. He states again continue work on physical therapy exercises that he has been given in the past at home. We talked about updating x-ray and MRI in the future if he is not improving. Follow-up 4 weeks as needed.\"      History 12/18/2024:    Jaguar reports that about 5 years ago he had similar but different kind of pain in his left leg and that went away with PT.  Then around July August of this year with no inciting event the current pain began in the left buttock down the left lateral thigh and calf and occasionally into the foot but he cannot remember exactly where that is and it is more of a tingling in the foot.  There is no true sensory loss and no motor deficits obvious to him.  Bowel and bladder and sexual functioning are normal and there is been no saddle anesthesia.  No other red flags.  He has gone back to his old HEP, and applied cold.  He played golf through the summer and he thinks that made it worse.      SYMPTOMS WORSENED " WITH prolonged sitting    SYMPTOMS IMPROVED WITH walking, medication, and ice    Pain score and diagram reviewed.  See questionnaire.      ROS: .  Otherwise negative for bowel/bladder dysfunction, balance changes, headache, leg pain/numbness/weakness, fevers, chills, night sweats, unexplained weight loss;  otherwise unremarkable.   See the patient's intake questionnaire from today for details.      MEDICATIONS:  Reviewed.    ALLERGIES:  Reviewed.     PAST MEDICAL/SURGICAL HISTORY:   Pertinent for chronic low back pain    SOCIAL HX: He is a  for [employer].  He is in a long-term relationship.  No kids.  Sports hobbies and activities: Golf, running which he cannot do now, attending sporting and music events,      OBJECTIVE:    IMAGING: Images and reports reviewed    MRI LUMBAR SPINE WITHOUT CONTRAST 11/6/2024 ([facility])    L4-5: Moderate disc degeneration with 7 x 8 mm left subarticular caudally dissecting disc extrusion impinging the transiting left L5 nerve. Overall mild spinal canal and biforaminal stenosis. No exiting nerve compression.    L3-4 through L1-2: Disc height and hydration preserved. No disc contour abnormality, spinal canal or foraminal stenosis identified. There is mild left facet joint arthropathy.    CONCLUSION: Moderate L4-5 disc degeneration with 7 x 8 mm left subarticular disc extrusion. Transiting left L5 nerve impinged.    EXAMINATION:    --CONSTITUTIONAL:   No acute distress.  The patient is well nourished and well groomed.  Transitions well and moves fluidly.  --SKIN:  Skin over the face, bilateral lower extremities, and posterior torso is clean, dry, intact without rashes.    --GAIT:  is non-antalgic. Flat foot, heel and toe walking:  normal   .  Squat and rise   normal    .  --STANDING EXAMINATION:    Symmetry of spine/pelvis   unremarkable   .      Range of motion full and painless in flexion.  Full in extension and I can reproduce some left leg symptoms with Kemps test  to the left but only to the knee.   Standing flexion   negative   .    Anna's sign   negative    .     Stork test   negative    .   --NEUROLOGICAL:     ROMBERG, TANDEM WALK, PRONATOR DRIFT:   Normal.   .  SENSATION to light touch is intact in bilateral thighs, lower legs and feet.   REFLEXES:  patellar 2+, and achilles 2+.  Babinski is negative. No clonus.  MANUAL MOTOR TESTING:  Hip flexion 5/5   Hip abduction 5/5   Hip adduction 5/5   Knee extension 5/5   Knee flexion 5/5   Ankle dorsiflexion 5/5   EHL 5/5   Ankle inversion 5/5   Ankle eversion 5/5  DURAL STRETCH TESTS:  SLR negative and negative slump and ankle dorsiflexion.          ASSESSMENT: Jaguar Liu is a 59 year old male who presents  today for new patient evaluation of:    Left L4-5 HNP with left L5 radiculopathy but normal neurologic testing and positive Kemps test to the left      PLAN:  L4-5 left paracentral IL LINA ordered with surgical follow-up 1 month later if symptoms do not disappear.  The MRI appearance looks like a calcified hard disc so he is prepared to hear that he might need a surgical decompression.    Advised patient to call or return early if symptoms worsen, or having problems controlling bladder and bowel function or worsening leg weakness.     Please note: Voice recognition software was used in this dictation.  It may therefore contain typographical errors.    Cecil Woodard MD

## 2024-12-11 ENCOUNTER — TELEPHONE (OUTPATIENT)
Dept: ORTHOPEDICS | Facility: CLINIC | Age: 59
End: 2024-12-11
Payer: COMMERCIAL

## 2024-12-11 NOTE — TELEPHONE ENCOUNTER
Left Voicemail (1st Attempt) for the patient to call back and schedule the following:    Appointment type: NEW HAND/WRIST  Provider: , Des, [place], or Sana  Return date: Next Available

## 2024-12-18 ENCOUNTER — PRE VISIT (OUTPATIENT)
Dept: NEUROSURGERY | Facility: CLINIC | Age: 59
End: 2024-12-18

## 2024-12-18 ENCOUNTER — OFFICE VISIT (OUTPATIENT)
Dept: NEUROSURGERY | Facility: CLINIC | Age: 59
End: 2024-12-18
Payer: COMMERCIAL

## 2024-12-18 VITALS
BODY MASS INDEX: 29.62 KG/M2 | HEART RATE: 69 BPM | WEIGHT: 200 LBS | SYSTOLIC BLOOD PRESSURE: 153 MMHG | DIASTOLIC BLOOD PRESSURE: 95 MMHG | HEIGHT: 69 IN

## 2024-12-18 DIAGNOSIS — M54.16 LUMBAR RADICULOPATHY: ICD-10-CM

## 2024-12-18 PROCEDURE — 99204 OFFICE O/P NEW MOD 45 MIN: CPT | Performed by: PREVENTIVE MEDICINE

## 2024-12-18 ASSESSMENT — PAIN SCALES - GENERAL: PAINLEVEL_OUTOF10: NO PAIN (1)

## 2024-12-18 NOTE — PATIENT INSTRUCTIONS
Jaguar, it is nice to meet you and I am sorry you are dealing with his herniated disc.  I am fairly certain it is pushing on the nerve going down your left leg and you might need surgery if the injection does not help.  If your pain goes away you can cancel the surgical consultation.  See the assessment and plan below for further details of our conversation today and I wish you a happy holiday season.    ASSESSMENT: Jaguar Liu is a 59 year old male who presents  today for new patient evaluation of:    Left L4-5 HNP with left L5 radiculopathy but normal neurologic testing and positive Kemps test to the left      PLAN:  L4-5 left paracentral IL LINA ordered with surgical follow-up 1 month later if symptoms do not disappear.  The MRI appearance looks like a calcified hard disc so he is prepared to hear that he might need a surgical decompression.

## 2024-12-18 NOTE — LETTER
"12/18/2024      Jaguar Liu  [address] [place] [address]      Dear Colleague,    Thank you for referring your patient, Jaguar Liu, to the [facility] [place]. Please see a copy of my visit note below.        SUBJECTIVE:  HPI:  Jaguar Liu  Is a 59 year old male who presents for new patient evaluation of low back pain upon referral from BALDEMAR Leung, whose 10/17/2024 note records:  \"History of low back pain: Left lower back pain that stared in August. Plays golf. Increase pain this then. Pain down left leg. Pain with range of motion of back. Some numbness/tingling in left leg.   Tx: Methocarbamol and diclofenac.  Physical therapy exercises that he has been given in the past.  He is looking for muscle relaxant that may be not be as drowsy for him.  He states the diclofenac did not help much.  He is looking for something to help with the pain.  Pain is about a 6 out of 10...  ...Talk to patient about his concerns we did talk about side effects of muscle relaxants causing drowsiness. He is interested in trying something else. Will give him Flexeril along with prednisone. Warning signs side effects were discussed. I did offer him a Toradol shot in clinic today. He deferred for now. He states again continue work on physical therapy exercises that he has been given in the past at home. We talked about updating x-ray and MRI in the future if he is not improving. Follow-up 4 weeks as needed.\"      History 12/18/2024:    Jaguar reports that about 5 years ago he had similar but different kind of pain in his left leg and that went away with PT.  Then around July August of this year with no inciting event the current pain began in the left buttock down the left lateral thigh and calf and occasionally into the foot but he cannot remember exactly where that is and it is more of a tingling in the foot.  There is no true sensory loss and no motor deficits obvious to him.  Bowel " and bladder and sexual functioning are normal and there is been no saddle anesthesia.  No other red flags.  He has gone back to his old HEP, and applied cold.  He played golf through the summer and he thinks that made it worse.      SYMPTOMS WORSENED WITH prolonged sitting    SYMPTOMS IMPROVED WITH walking, medication, and ice    Pain score and diagram reviewed.  See questionnaire.      ROS: .  Otherwise negative for bowel/bladder dysfunction, balance changes, headache, leg pain/numbness/weakness, fevers, chills, night sweats, unexplained weight loss;  otherwise unremarkable.   See the patient's intake questionnaire from today for details.      MEDICATIONS:  Reviewed.    ALLERGIES:  Reviewed.     PAST MEDICAL/SURGICAL HISTORY:   Pertinent for chronic low back pain    SOCIAL HX: He is a  for [employer].  He is in a long-term relationship.  No kids.  Sports hobbies and activities: Golf, running which he cannot do now, attending sporting and music events,      OBJECTIVE:    IMAGING: Images and reports reviewed    MRI LUMBAR SPINE WITHOUT CONTRAST 11/6/2024 ([facility])    L4-5: Moderate disc degeneration with 7 x 8 mm left subarticular caudally dissecting disc extrusion impinging the transiting left L5 nerve. Overall mild spinal canal and biforaminal stenosis. No exiting nerve compression.    L3-4 through L1-2: Disc height and hydration preserved. No disc contour abnormality, spinal canal or foraminal stenosis identified. There is mild left facet joint arthropathy.    CONCLUSION: Moderate L4-5 disc degeneration with 7 x 8 mm left subarticular disc extrusion. Transiting left L5 nerve impinged.    EXAMINATION:    --CONSTITUTIONAL:   No acute distress.  The patient is well nourished and well groomed.  Transitions well and moves fluidly.  --SKIN:  Skin over the face, bilateral lower extremities, and posterior torso is clean, dry, intact without rashes.    --GAIT:  is non-antalgic. Flat foot, heel and toe  walking:  normal   .  Squat and rise   normal    .  --STANDING EXAMINATION:    Symmetry of spine/pelvis   unremarkable   .      Range of motion full and painless in flexion.  Full in extension and I can reproduce some left leg symptoms with Kemps test to the left but only to the knee.   Standing flexion   negative   .    Anna's sign   negative    .     Stork test   negative    .   --NEUROLOGICAL:     ROMBERG, TANDEM WALK, PRONATOR DRIFT:   Normal.   .  SENSATION to light touch is intact in bilateral thighs, lower legs and feet.   REFLEXES:  patellar 2+, and achilles 2+.  Babinski is negative. No clonus.  MANUAL MOTOR TESTING:  Hip flexion 5/5   Hip abduction 5/5   Hip adduction 5/5   Knee extension 5/5   Knee flexion 5/5   Ankle dorsiflexion 5/5   EHL 5/5   Ankle inversion 5/5   Ankle eversion 5/5  DURAL STRETCH TESTS:  SLR negative and negative slump and ankle dorsiflexion.          ASSESSMENT: Jaguar Liu is a 59 year old male who presents  today for new patient evaluation of:    Left L4-5 HNP with left L5 radiculopathy but normal neurologic testing and positive Kemps test to the left      PLAN:  L4-5 left paracentral IL LINA ordered with surgical follow-up 1 month later if symptoms do not disappear.  The MRI appearance looks like a calcified hard disc so he is prepared to hear that he might need a surgical decompression.    Advised patient to call or return early if symptoms worsen, or having problems controlling bladder and bowel function or worsening leg weakness.     Please note: Voice recognition software was used in this dictation.  It may therefore contain typographical errors.    Cecil Woodard MD             Again, thank you for allowing me to participate in the care of your patient.        Sincerely,        Cecil Woodard MD

## 2024-12-18 NOTE — NURSING NOTE
"Reason For Visit:   Chief Complaint   Patient presents with    Consult     Low back pain         Occupation:   Currently working? Yes.  Work status?  Full time.    Sports: disc golf  Activities: walking treadmill             BP (!) 153/95   Pulse 69   Ht 1.753 m (5' 9\")   Wt 90.7 kg (200 lb)   BMI 29.53 kg/m        No Known Allergies    Current Outpatient Medications   Medication Sig Dispense Refill    cetirizine (ZYRTEC) 10 MG tablet Take 10 mg by mouth 2 times daily       Esomeprazole Magnesium (NEXIUM PO) Take by mouth daily      indomethacin (INDOCIN) 50 MG capsule Take 1 capsule (50 mg) by mouth 2 times daily (with meals). 60 capsule 0    methocarbamol (ROBAXIN) 500 MG tablet Take 1 tablet (500 mg) by mouth 3 times daily as needed for muscle spasms. 60 tablet 0     No current facility-administered medications for this visit.         Darla Severin-Brown, LPN   "

## 2024-12-23 NOTE — TELEPHONE ENCOUNTER
SPINE PATIENTS - NEW PROTOCOL PREVISIT    RECORDS RECEIVED FROM: Referred by Otto Leung PA-C   REASON FOR VISIT: Lumbar radiculopathy    PROVIDER: Remigio   DATE OF APPT: 12/26/2024   NOTES (FOR ALL VISITS) STATUS DETAILS   OFFICE NOTE from referring provider Internal Referral and notes in chart   OFFICE NOTE from other specialist N/A    DISCHARGE SUMMARY from hospital N/A    DISCHARGE REPORT from ER N/A    OPERATIVE REPORT N/A    EMG REPORT N/A    Injection N/A    Physical therapy Internal PT last completed in 2021   IMAGING  (FOR ALL VISITS)     MRI (HEAD, NECK, SPINE) Received MRI Lumbar 11/06/2024 w/[facility]. In PACs   XRAY (SPINE) *NEUROSURGERY* In process XR prior   CT (HEAD, NECK, SPINE) N/A

## 2024-12-26 ENCOUNTER — TELEPHONE (OUTPATIENT)
Dept: NEUROSURGERY | Facility: CLINIC | Age: 59
End: 2024-12-26

## 2024-12-26 ENCOUNTER — ANCILLARY PROCEDURE (OUTPATIENT)
Dept: GENERAL RADIOLOGY | Facility: CLINIC | Age: 59
End: 2024-12-26
Attending: STUDENT IN AN ORGANIZED HEALTH CARE EDUCATION/TRAINING PROGRAM
Payer: COMMERCIAL

## 2024-12-26 ENCOUNTER — OFFICE VISIT (OUTPATIENT)
Dept: NEUROSURGERY | Facility: CLINIC | Age: 59
End: 2024-12-26
Attending: PREVENTIVE MEDICINE
Payer: COMMERCIAL

## 2024-12-26 ENCOUNTER — PRE VISIT (OUTPATIENT)
Dept: NEUROSURGERY | Facility: CLINIC | Age: 59
End: 2024-12-26

## 2024-12-26 VITALS
HEIGHT: 69 IN | HEART RATE: 66 BPM | WEIGHT: 200 LBS | BODY MASS INDEX: 29.62 KG/M2 | SYSTOLIC BLOOD PRESSURE: 150 MMHG | DIASTOLIC BLOOD PRESSURE: 93 MMHG

## 2024-12-26 DIAGNOSIS — M54.16 LUMBAR RADICULOPATHY: ICD-10-CM

## 2024-12-26 PROCEDURE — 72120 X-RAY BEND ONLY L-S SPINE: CPT | Performed by: STUDENT IN AN ORGANIZED HEALTH CARE EDUCATION/TRAINING PROGRAM

## 2024-12-26 RX ORDER — METHOCARBAMOL 500 MG/1
500 TABLET, FILM COATED ORAL 3 TIMES DAILY PRN
Qty: 60 TABLET | Refills: 0 | Status: SHIPPED | OUTPATIENT
Start: 2024-12-26

## 2024-12-26 RX ORDER — GABAPENTIN 300 MG/1
300 CAPSULE ORAL 3 TIMES DAILY
Qty: 90 CAPSULE | Refills: 1 | Status: SHIPPED | OUTPATIENT
Start: 2024-12-26

## 2024-12-26 NOTE — LETTER
"12/26/2024      Jaguar Liu  [address] [place] [id number]      Dear Colleague,    Thank you for referring your patient, Jaguar Liu, to the [facility] [place]. Please see a copy of my visit note below.    HPI:  59-year-old male with 5 months of low back pain radiating to the left lower extremity.  He does feel the symptoms are getting worse since it started.  The pain radiates down the lateral aspect of the leg to the foot.  This gets worse with activity.  He has been doing stretching at home but no physical therapy to this point.  He has not done any epidural steroid injections.  He denies any weakness.  He has been taking Robaxin scheduled from his primary care doctor which has been helping with some of the symptoms.  Denies any right leg pain.  Current Outpatient Medications   Medication Sig Dispense Refill     cetirizine (ZYRTEC) 10 MG tablet Take 10 mg by mouth 2 times daily        Esomeprazole Magnesium (NEXIUM PO) Take by mouth daily       indomethacin (INDOCIN) 50 MG capsule Take 1 capsule (50 mg) by mouth 2 times daily (with meals). 60 capsule 0     methocarbamol (ROBAXIN) 500 MG tablet Take 1 tablet (500 mg) by mouth 3 times daily as needed for muscle spasms. 60 tablet 0     No current facility-administered medications for this visit.      Physical Exam:  Vital signs:      BP: (!) 150/93 Pulse: 66           Height: 175.3 cm (5' 9\") Weight: 90.7 kg (200 lb)  Estimated body mass index is 29.53 kg/m  as calculated from the following:    Height as of this encounter: 1.753 m (5' 9\").    Weight as of this encounter: 90.7 kg (200 lb).  This full-strength in his bilateral lower extremities.  Sensation is intact to light touch throughout.  Patellar reflexes are 1+ bilaterally.  Results Reviewed:  I personally viewed the images of an MRI of the lumbar spine.  This shows an L4-5 disc herniation paracentral the left side likely compressing the traversing L5 nerve root.  No " other areas of significant central canal or foraminal stenosis present.  Assessment:  59-year-old male with a left L5 radiculopathy from an L4-5 disc herniation.  Plan:  At this time we will have him start with physical therapy and also discussed the possibility of an injection with him.  He will consider the injection option but start physical therapy now.  Should he not get better with a few weeks of physical therapy surgery would be reasonable.  This would be done through minimally invasive laminotomy and discectomy approach.  We discussed risks and benefits of this procedure in detail.  We also discussed expectations for recovery after the procedure.  He will call us if he has not improved with physical therapy to schedule the surgery in the future at [facility].  For now I have refilled his Robaxin and started him on gabapentin to try and improve symptoms as well.    Johnson Flood MD      Again, thank you for allowing me to participate in the care of your patient.        Sincerely,        Johnson Flood MD    Electronically signed

## 2024-12-26 NOTE — PROGRESS NOTES
"HPI:  59-year-old male with 5 months of low back pain radiating to the left lower extremity.  He does feel the symptoms are getting worse since it started.  The pain radiates down the lateral aspect of the leg to the foot.  This gets worse with activity.  He has been doing stretching at home but no physical therapy to this point.  He has not done any epidural steroid injections.  He denies any weakness.  He has been taking Robaxin scheduled from his primary care doctor which has been helping with some of the symptoms.  Denies any right leg pain.  Current Outpatient Medications   Medication Sig Dispense Refill    cetirizine (ZYRTEC) 10 MG tablet Take 10 mg by mouth 2 times daily       Esomeprazole Magnesium (NEXIUM PO) Take by mouth daily      indomethacin (INDOCIN) 50 MG capsule Take 1 capsule (50 mg) by mouth 2 times daily (with meals). 60 capsule 0    methocarbamol (ROBAXIN) 500 MG tablet Take 1 tablet (500 mg) by mouth 3 times daily as needed for muscle spasms. 60 tablet 0     No current facility-administered medications for this visit.      Physical Exam:  Vital signs:      BP: (!) 150/93 Pulse: 66           Height: 175.3 cm (5' 9\") Weight: 90.7 kg (200 lb)  Estimated body mass index is 29.53 kg/m  as calculated from the following:    Height as of this encounter: 1.753 m (5' 9\").    Weight as of this encounter: 90.7 kg (200 lb).  This full-strength in his bilateral lower extremities.  Sensation is intact to light touch throughout.  Patellar reflexes are 1+ bilaterally.  Results Reviewed:  I personally viewed the images of an MRI of the lumbar spine.  This shows an L4-5 disc herniation paracentral the left side likely compressing the traversing L5 nerve root.  No other areas of significant central canal or foraminal stenosis present.  Assessment:  59-year-old male with a left L5 radiculopathy from an L4-5 disc herniation.  Plan:  At this time we will have him start with physical therapy and also discussed the " possibility of an injection with him.  He will consider the injection option but start physical therapy now.  Should he not get better with a few weeks of physical therapy surgery would be reasonable.  This would be done through minimally invasive laminotomy and discectomy approach.  We discussed risks and benefits of this procedure in detail.  We also discussed expectations for recovery after the procedure.  He will call us if he has not improved with physical therapy to schedule the surgery in the future at [facility].  For now I have refilled his Robaxin and started him on gabapentin to try and improve symptoms as well.    Johnson Flood MD

## 2024-12-26 NOTE — TELEPHONE ENCOUNTER
I  called patient to see if he had injection that Dr. Woodard ordered. He had appointment today with Dr. Flood and was to get injection first.

## 2025-02-27 ENCOUNTER — THERAPY VISIT (OUTPATIENT)
Dept: PHYSICAL THERAPY | Facility: CLINIC | Age: 60
End: 2025-02-27
Attending: STUDENT IN AN ORGANIZED HEALTH CARE EDUCATION/TRAINING PROGRAM
Payer: COMMERCIAL

## 2025-02-27 DIAGNOSIS — M54.16 LUMBAR RADICULOPATHY: ICD-10-CM

## 2025-02-27 PROCEDURE — 97110 THERAPEUTIC EXERCISES: CPT | Mod: GP

## 2025-02-27 PROCEDURE — 97161 PT EVAL LOW COMPLEX 20 MIN: CPT | Mod: GP

## 2025-02-27 NOTE — PROGRESS NOTES
PHYSICAL THERAPY EVALUATION  Type of Visit: Evaluation       Fall Risk Screen:  Fall screen completed by: PT  Have you fallen 2 or more times in the past year?: No  Have you fallen and had an injury in the past year?: No  Is patient a fall risk?: No    Subjective         Presenting condition or subjective complaint: From late Aug to Dec I walked like a 88year old  Date of onset: 12/26/24 (date of referral)    Relevant medical history:     Dates & types of surgery: 2 knee 2shoulder many years ago    60 y/o male presents to physical therapy with chief complaint of lower back pain. Pain was worse right around [place] time - was doing some small exercises at home to manage. Reports some slight pain in the left hip/glut area. Prior to feeling better he couldn't stand up without pain shooting down his entire left leg. Things have been pretty good as of late. He's walking on treadmill consistently and is considering a return to jogging. Goal today is to find other exercises that will allow him to return to running. Denies radicular symptoms like numbness and tingling.     Prior diagnostic imaging/testing results: MRI     Prior therapy history for the same diagnosis, illness or injury: Yes PT pre covid    Prior Level of Function  Transfers:   Ambulation:   ADL:   IADL:     Living Environment  Social support: With a significant other or spouse   Type of home: House   Stairs to enter the home: Yes 17 Is there a railing: Yes     Ramp: No   Stairs inside the home: Yes 17 Is there a railing: Yes     Help at home: None  Equipment owned:       Employment: Yes   Hobbies/Interests: golf running music    Patient goals for therapy: Would like to get back to running ang golfing    Pain assessment:      Objective   LUMBAR SPINE EVALUATION  PAIN: Pain Level at Rest: 1/10  Pain Level with Use: 1/10  Pain Location: hip  Pain Quality: tightness  Pain Frequency: intermittent  Pain is Exacerbated By: sitting for a long  time, standing for >1 hr,   Pain is Relieved By: walking, exercises   Pain Progression: Improved  INTEGUMENTARY (edema, incisions):   POSTURE:   GAIT:   Weightbearing Status:   Assistive Device(s):   Gait Deviations: WNL  BALANCE/PROPRIOCEPTION:   WEIGHTBEARING ALIGNMENT:   NON-WEIGHTBEARING ALIGNMENT:    ROM:  Mild loss in lumbar flexion AROM, all other lumbar AROM WNL; PROM of gary hips WNL  PELVIC/SI SCREEN:   STRENGTH:  All hip MMTs 5/5 gary except for gary hip abduction 4+/5    MYOTOMES: WNL  PALPATION:  no overt TTP  SPINAL SEGMENTAL CONCLUSIONS:  WNL with PA glides of lumbar spine      Assessment & Plan   CLINICAL IMPRESSIONS  Medical Diagnosis: Lumbar radiculopathy    Treatment Diagnosis: LBP with radiating pain into the left LE   Impression/Assessment: Patient is a 59 year old male with lumbar spine complaints.  The following significant findings have been identified: Pain, Decreased ROM/flexibility, Decreased strength, Impaired muscle performance, and Decreased activity tolerance. These impairments interfere with their ability to perform self care tasks, work tasks, recreational activities, and community mobility as compared to previous level of function.     Clinical Decision Making (Complexity):  Clinical Presentation: Stable/Uncomplicated  Clinical Presentation Rationale: based on medical and personal factors listed in PT evaluation  Clinical Decision Making (Complexity): Low complexity    PLAN OF CARE  Treatment Interventions:  Modalities: Cryotherapy, E-stim, Hot Pack  Interventions: Manual Therapy, Neuromuscular Re-education, Therapeutic Activity, Therapeutic Exercise    Long Term Goals     PT Goal 1  Goal Identifier: LTG 1  Goal Description: pt to be able to return to jogging with <2/10 pain in LB and L LE  Rationale: to maximize safety and independence with performance of ADLs and functional tasks  Target Date: 04/10/25      Frequency of Treatment: 1x per week  Duration of Treatment: 6  weeks    Recommended Referrals to Other Professionals:   Education Assessment:   Learner/Method: Patient;No Barriers to Learning    Risks and benefits of evaluation/treatment have been explained.   Patient/Family/caregiver agrees with Plan of Care.     Evaluation Time:     PT Beto Low Complexity Minutes (69403): 20       Signing Clinician: Almas Rich PT

## 2025-03-13 ENCOUNTER — THERAPY VISIT (OUTPATIENT)
Dept: PHYSICAL THERAPY | Facility: CLINIC | Age: 60
End: 2025-03-13
Attending: STUDENT IN AN ORGANIZED HEALTH CARE EDUCATION/TRAINING PROGRAM
Payer: COMMERCIAL

## 2025-03-13 DIAGNOSIS — M54.16 LUMBAR RADICULOPATHY: Primary | ICD-10-CM

## 2025-07-28 ENCOUNTER — OFFICE VISIT (OUTPATIENT)
Dept: URGENT CARE | Facility: URGENT CARE | Age: 60
End: 2025-07-28
Payer: COMMERCIAL

## 2025-07-28 DIAGNOSIS — K04.7 TOOTH INFECTION: Primary | ICD-10-CM

## 2025-07-28 PROCEDURE — 3075F SYST BP GE 130 - 139MM HG: CPT | Performed by: FAMILY MEDICINE

## 2025-07-28 PROCEDURE — 3079F DIAST BP 80-89 MM HG: CPT | Performed by: FAMILY MEDICINE

## 2025-07-28 PROCEDURE — 99213 OFFICE O/P EST LOW 20 MIN: CPT | Performed by: FAMILY MEDICINE

## 2025-07-28 PROCEDURE — 1125F AMNT PAIN NOTED PAIN PRSNT: CPT | Performed by: FAMILY MEDICINE

## 2025-07-28 RX ORDER — CLINDAMYCIN HYDROCHLORIDE 300 MG/1
300 CAPSULE ORAL 4 TIMES DAILY
Qty: 28 CAPSULE | Refills: 0 | Status: SHIPPED | OUTPATIENT
Start: 2025-07-28 | End: 2025-08-04

## 2025-07-28 ASSESSMENT — PAIN SCALES - GENERAL: PAINLEVEL_OUTOF10: MODERATE PAIN (4)

## 2025-07-28 NOTE — PROGRESS NOTES
"Assessment & Plan       ICD-10-CM    1. Tooth infection  K04.7 clindamycin (CLEOCIN) 300 MG capsule           Ultimately he needs to get dental care and he has an appointment later today.  The dentist stated that he preferred that Jaguar get antibiotic from the doctor so I am giving him prescription for clindamycin.  Encouraged to rinse the mouth and follow dental care instructions and follow-up here only as needed.  He is likely to need at least tooth extraction or root canal or other surgical intervention.    See patient instructions which were reviewed in detail with patient:    Patient Instructions   I am starting you on an antibiotics called Clindamycin. Take 1 pill four times daily, with food, for 7 days. Please take the full course even if you feel better sooner.     Keep your appointment with the dentist.     If things are not improving after the medication is complete please return to see us or the dentist until this is healed.       Kamla Cheney MD  [facility] [place]    Subjective     Jaguar is a 59 year old male who presents to clinic today for the following health issues:  Chief Complaint   Patient presents with    Dental Pain     Right lower tooth pain and jaw swelling. Pain is worse at night and disrupting sleep. Pt has dental appointment later today but would like antibiotics for possible infection.      HPI    See notes above.   He thinks he may have injured a crown or tooth on the right lower jaw, was eating some pecans about 2 weeks ago and \"felt something\" there, but didn't think much of it. Since then it has gotten worse and this weekend it just suddenly \"exploded\". Has become much more swollen and tender. Hasn't been to the dentist but has an appt later today and they told him to also see the doctor and get on some antibiotics.     7 pt ROS is otherwise negative except as noted in HPI.      Objective    BP (!) 164/93 (BP Location: Left arm, Cuff Size: Adult " Regular)   Pulse 67   Temp 97.6  F (36.4  C) (Tympanic)   Resp 16   Wt 95.5 kg (210 lb 9.6 oz)   SpO2 99%   BMI 31.10 kg/m    Physical Exam   Vitals noted. BP rechecked by me and was 138/80, initially 164/93.    Patient alert, oriented, and in no acute distress.   The right side of his face is swollen inferiorly along the mandible.  It is tender to touch.  Roughly golf ball sized swelling.  Oral exam appears normal.  He does have 2 crowns on the back right lower teeth, none of the teeth down there is tender with palpation or pressure.  However, palpation along the external gingiva and into the soft tissue along the mandible is very tender for him.  I cannot appreciate any discrete masses but the entire area is generally swollen.  Neck:  Supple without lymphadenopathy, JVD or masses.   CV:  RRR without murmur.   Respiratory:  Lungs clear to auscultation bilaterally.

## 2025-07-28 NOTE — PATIENT INSTRUCTIONS
I am starting you on an antibiotics called Clindamycin. Take 1 pill four times daily, with food, for 7 days. Please take the full course even if you feel better sooner.     Keep your appointment with the dentist.     If things are not improving after the medication is complete please return to see us or the dentist until this is healed.

## 2025-07-28 NOTE — PROGRESS NOTES
Urgent Care Clinic Visit  Chief Complaint   Patient presents with    Dental Pain     Right lower tooth pain and jaw swelling. Pain is worse at night and disrupting sleep. Pt has dental appointment later today but would like antibiotics for possible infection.           7/28/2025    10:39 AM   Additional Questions   Roomed by Nena   Accompanied by Self

## 2025-07-29 VITALS
TEMPERATURE: 97.6 F | WEIGHT: 210.6 LBS | OXYGEN SATURATION: 99 % | SYSTOLIC BLOOD PRESSURE: 138 MMHG | DIASTOLIC BLOOD PRESSURE: 80 MMHG | RESPIRATION RATE: 16 BRPM | BODY MASS INDEX: 31.1 KG/M2 | HEART RATE: 67 BPM